# Patient Record
Sex: FEMALE | Race: BLACK OR AFRICAN AMERICAN | NOT HISPANIC OR LATINO | Employment: FULL TIME | URBAN - METROPOLITAN AREA
[De-identification: names, ages, dates, MRNs, and addresses within clinical notes are randomized per-mention and may not be internally consistent; named-entity substitution may affect disease eponyms.]

---

## 2017-06-07 ENCOUNTER — CLINICAL SUPPORT (OUTPATIENT)
Dept: OTHER | Facility: CLINIC | Age: 22
End: 2017-06-07
Payer: COMMERCIAL

## 2017-06-07 VITALS
BODY MASS INDEX: 20.16 KG/M2 | DIASTOLIC BLOOD PRESSURE: 71 MMHG | WEIGHT: 121 LBS | SYSTOLIC BLOOD PRESSURE: 111 MMHG | HEIGHT: 65 IN

## 2017-06-07 DIAGNOSIS — Z00.8 HEALTH EXAMINATION IN POPULATION SURVEYS: Primary | ICD-10-CM

## 2017-06-07 LAB
GLUCOSE SERPL-MCNC: NORMAL MG/DL (ref 60–140)
POC CHOLESTEROL, HDL: 71 MG/DL (ref 40–?)
POC CHOLESTEROL, LDL: 95 MG/DL (ref ?–160)
POC CHOLESTEROL, TOTAL: 177 MG/DL (ref ?–240)
POC GLUCOSE FASTING: 87 MG/DL (ref 60–110)
POC TOTAL CHOLESTEROL / HDL RATIO: 2.4 (ref ?–6)
POC TRIGLYCERIDES: 53 MG/DL (ref ?–160)

## 2017-06-07 PROCEDURE — 99401 PREV MED CNSL INDIV APPRX 15: CPT | Mod: S$GLB,,, | Performed by: INTERNAL MEDICINE

## 2017-06-07 PROCEDURE — 82947 ASSAY GLUCOSE BLOOD QUANT: CPT | Mod: QW,S$GLB,, | Performed by: INTERNAL MEDICINE

## 2017-06-07 PROCEDURE — 80061 LIPID PANEL: CPT | Mod: QW,S$GLB,, | Performed by: INTERNAL MEDICINE

## 2018-06-13 ENCOUNTER — CLINICAL SUPPORT (OUTPATIENT)
Dept: OTHER | Facility: CLINIC | Age: 23
End: 2018-06-13
Payer: COMMERCIAL

## 2018-06-13 VITALS
SYSTOLIC BLOOD PRESSURE: 100 MMHG | WEIGHT: 123.38 LBS | BODY MASS INDEX: 20.55 KG/M2 | DIASTOLIC BLOOD PRESSURE: 64 MMHG | HEIGHT: 65 IN

## 2018-06-13 DIAGNOSIS — Z00.8 HEALTH EXAMINATION IN POPULATION SURVEYS: Primary | ICD-10-CM

## 2018-06-13 LAB
GLUCOSE SERPL-MCNC: NORMAL MG/DL (ref 60–140)
POC CHOLESTEROL, HDL: 62 MG/DL (ref 40–?)
POC CHOLESTEROL, LDL: 125 MG/DL (ref ?–160)
POC CHOLESTEROL, TOTAL: 198 MG/DL (ref ?–240)
POC GLUCOSE FASTING: 78 MG/DL (ref 60–110)
POC TOTAL CHOLESTEROL / HDL RATIO: 3.19 (ref ?–6)
POC TRIGLYCERIDES: 58 MG/DL (ref ?–160)

## 2018-06-13 PROCEDURE — 99401 PREV MED CNSL INDIV APPRX 15: CPT | Mod: S$GLB,,, | Performed by: INTERNAL MEDICINE

## 2018-06-13 PROCEDURE — 80061 LIPID PANEL: CPT | Mod: QW,S$GLB,, | Performed by: INTERNAL MEDICINE

## 2018-06-13 PROCEDURE — 82947 ASSAY GLUCOSE BLOOD QUANT: CPT | Mod: QW,S$GLB,, | Performed by: INTERNAL MEDICINE

## 2021-12-29 ENCOUNTER — TELEPHONE (OUTPATIENT)
Dept: INTERNAL MEDICINE | Facility: CLINIC | Age: 26
End: 2021-12-29

## 2022-10-08 ENCOUNTER — HOSPITAL ENCOUNTER (EMERGENCY)
Facility: HOSPITAL | Age: 27
Discharge: HOME | End: 2022-10-08
Attending: EMERGENCY MEDICINE
Payer: MEDICAID

## 2022-10-08 VITALS
BODY MASS INDEX: 20.71 KG/M2 | HEART RATE: 90 BPM | DIASTOLIC BLOOD PRESSURE: 81 MMHG | WEIGHT: 124.44 LBS | OXYGEN SATURATION: 95 % | SYSTOLIC BLOOD PRESSURE: 120 MMHG | TEMPERATURE: 99 F | RESPIRATION RATE: 20 BRPM

## 2022-10-08 DIAGNOSIS — J45.901 EXACERBATION OF ASTHMA, UNSPECIFIED ASTHMA SEVERITY, UNSPECIFIED WHETHER PERSISTENT: Primary | ICD-10-CM

## 2022-10-08 PROCEDURE — 25000242 PHARM REV CODE 250 ALT 637 W/ HCPCS: Performed by: EMERGENCY MEDICINE

## 2022-10-08 PROCEDURE — 94640 AIRWAY INHALATION TREATMENT: CPT | Mod: XB

## 2022-10-08 PROCEDURE — 99283 EMERGENCY DEPT VISIT LOW MDM: CPT | Mod: 25

## 2022-10-08 RX ORDER — IPRATROPIUM BROMIDE AND ALBUTEROL SULFATE 2.5; .5 MG/3ML; MG/3ML
3 SOLUTION RESPIRATORY (INHALATION)
Status: COMPLETED | OUTPATIENT
Start: 2022-10-08 | End: 2022-10-08

## 2022-10-08 RX ORDER — ALBUTEROL SULFATE 90 UG/1
1-2 AEROSOL, METERED RESPIRATORY (INHALATION) EVERY 6 HOURS PRN
Qty: 18 G | Refills: 0 | Status: SHIPPED | OUTPATIENT
Start: 2022-10-08

## 2022-10-08 RX ADMIN — IPRATROPIUM BROMIDE AND ALBUTEROL SULFATE 3 ML: 2.5; .5 SOLUTION RESPIRATORY (INHALATION) at 03:10

## 2022-10-08 RX ADMIN — IPRATROPIUM BROMIDE AND ALBUTEROL SULFATE 3 ML: 2.5; .5 SOLUTION RESPIRATORY (INHALATION) at 04:10

## 2022-10-08 NOTE — Clinical Note
"Georgina Rodrigueztani Interiano was seen and treated in our emergency department on 10/8/2022.  She may return to work on 10/10/2022.       If you have any questions or concerns, please don't hesitate to call.      Wali Noble RN    "

## 2022-10-08 NOTE — ED PROVIDER NOTES
SCRIBE #1 NOTE: I, Pipe Farah, am scribing for, and in the presence of, Luis Wu MD. I have scribed the entire note.      History      Chief Complaint   Patient presents with    Shortness of Breath     Pt. C/o having a history of asthma, and is having wheezing, and SOB. With a cough. Pt states he home O2 stat was 87-90. Pt also states she does not have a MDI at home         Review of patient's allergies indicates:  No Known Allergies     HPI   HPI    10/8/2022, 3:37 AM   History obtained from the patient      History of Present Illness: Georgina Interiano is a 26 y.o. female patient who presents to the Emergency Department for SOB, onset 3 hours PTA. Pt states that she was dx with asthma following her first pregnancy. Symptoms are constant and moderate in severity. No mitigating or exacerbating factors reported. Associated sxs include cough. Patient denies any fever, chills, n/v/d, CP, weakness, numbness, dizziness, headache, and all other sxs at this time. Pt states that she uses Symbicort at home, as well as her son's nebulizer machine. Pt states that she does not have a nebulizer machine of her own. No further complaints or concerns at this time.     Arrival mode: Personal vehicle    PCP: SUREKHA Davila Jr       Past Medical History:  No past medical history on file.    Past Surgical History:  No past surgical history on file.      Family History:  No family history on file.    Social History:  Social History     Tobacco Use    Smoking status: Never    Smokeless tobacco: Not on file   Substance and Sexual Activity    Alcohol use: No    Drug use: No    Sexual activity: Not on file       ROS   Review of Systems   Constitutional:  Negative for chills and fever.   HENT:  Negative for sore throat.    Respiratory:  Positive for cough and shortness of breath.    Cardiovascular:  Negative for chest pain.   Gastrointestinal:  Negative for diarrhea, nausea and vomiting.   Genitourinary:  Negative for dysuria.    Musculoskeletal:  Negative for back pain.   Skin:  Negative for rash.   Neurological:  Negative for dizziness, weakness, light-headedness, numbness and headaches.   Hematological:  Does not bruise/bleed easily.   All other systems reviewed and are negative.    Physical Exam      Initial Vitals [10/08/22 0237]   BP Pulse Resp Temp SpO2   103/77 97 (!) 22 98.9 °F (37.2 °C) (!) 94 %      MAP       --          Physical Exam  Nursing Notes and Vital Signs Reviewed.  Constitutional: Patient is in no acute distress. Well-developed and well-nourished.  Head: Atraumatic. Normocephalic.  Eyes: PERRL. EOM intact. Conjunctivae are not pale. No scleral icterus.  ENT: Mucous membranes are moist. Oropharynx is clear and symmetric.    Neck: Supple. Full ROM. No lymphadenopathy.  Cardiovascular: Regular rate. Regular rhythm. No murmurs, rubs, or gallops. Distal pulses are 2+ and symmetric.  Pulmonary/Chest: Expiratory wheezing with prolonged expiratory phase.  Abdominal: Soft and non-distended.  There is no tenderness.  No rebound, guarding, or rigidity.   Musculoskeletal: Moves all extremities. No obvious deformities. No edema.  Skin: Warm and dry.  Neurological:  Alert, awake, and appropriate.  Normal speech.  No acute focal neurological deficits are appreciated.  Psychiatric: Normal affect. Good eye contact. Appropriate in content.    ED Course    Procedures  ED Vital Signs:  Vitals:    10/08/22 0237 10/08/22 0255 10/08/22 0333 10/08/22 0356   BP: 103/77 (!) 151/82 130/81    Pulse: 97 93 102 88   Resp: (!) 22 (!) 22 (!) 22 (!) 21   Temp: 98.9 °F (37.2 °C) 98.9 °F (37.2 °C)     TempSrc: Oral      SpO2: (!) 94% (!) 94% (!) 94% 95%   Weight: 56.4 kg (124 lb 7.2 oz)       10/08/22 0417 10/08/22 0456   BP:  120/81   Pulse: 82 90   Resp: 18 20   Temp:  99 °F (37.2 °C)   TempSrc:     SpO2: 96% 95%   Weight:         Abnormal Lab Results:  Labs Reviewed - No data to display     Imaging Results:  Imaging Results    None                  The Emergency Provider reviewed the vital signs and test results, which are outlined above.    ED Discussion     4:54 AM: Reassessed pt at this time.  Pt states her condition has improved at this time. Discussed with pt all pertinent ED information. Discussed pt dx and plan of tx. Gave pt all f/u and return to the ED instructions. All questions and concerns were addressed at this time. Pt expresses understanding of information and instructions, and is comfortable with plan to discharge. Pt is stable for discharge.    I discussed with patient and/or family/caretaker that evaluation in the ED does not suggest any emergent or life threatening medical conditions requiring immediate intervention beyond what was provided in the ED, and I believe patient is safe for discharge.  Regardless, an unremarkable evaluation in the ED does not preclude the development or presence of a serious of life threatening condition. As such, patient was instructed to return immediately for any worsening or change in current symptoms.         ED Medication(s):  Medications   albuterol-ipratropium 2.5 mg-0.5 mg/3 mL nebulizer solution 3 mL (3 mLs Nebulization Given 10/8/22 0417)   albuterol-ipratropium 2.5 mg-0.5 mg/3 mL nebulizer solution 3 mL (3 mLs Nebulization Given 10/8/22 0356)        Follow-up Information       Call  SUREKHA Davila Jr.    Specialty: Family Medicine  Contact information:  [address] [facility] [id number] [phone]               [facility] - Emergency Dept..    Specialty: Emergency Medicine  Why: As needed, If symptoms worsen  Contact information:  [address] [facility] [address] [phone]                         Discharge Medication List as of 10/8/2022  4:55 AM        START taking these medications    Details   albuterol (PROVENTIL/VENTOLIN HFA) 90 mcg/actuation inhaler Inhale 1-2 puffs into the lungs every 6 (six) hours as needed for Wheezing.  Rescue, Starting Sat 10/8/2022, Print               Medical Decision Making              Scribe Attestation:   Scribe #1: I performed the above scribed service and the documentation accurately describes the services I performed. I attest to the accuracy of the note.    Attending:   Physician Attestation Statement for Scribe #1: I, Luis Wu MD, personally performed the services described in this documentation, as scribed by Pipe Farah, in my presence, and it is both accurate and complete.          Clinical Impression       ICD-10-CM ICD-9-CM   1. Exacerbation of asthma, unspecified asthma severity, unspecified whether persistent  J45.901 493.92       Disposition:   Disposition: Discharged  Condition: Stable       Luis Wu MD  10/17/22 0156

## 2024-11-26 ENCOUNTER — HOSPITAL ENCOUNTER (OUTPATIENT)
Facility: HOSPITAL | Age: 29
Discharge: HOME | End: 2024-11-26
Attending: EMERGENCY MEDICINE | Admitting: HOSPITALIST
Payer: COMMERCIAL

## 2024-11-26 VITALS
RESPIRATION RATE: 18 BRPM | HEART RATE: 94 BPM | SYSTOLIC BLOOD PRESSURE: 99 MMHG | DIASTOLIC BLOOD PRESSURE: 54 MMHG | OXYGEN SATURATION: 96 % | TEMPERATURE: 98 F

## 2024-11-26 DIAGNOSIS — J96.01 ACUTE RESPIRATORY FAILURE WITH HYPOXIA: Primary | ICD-10-CM

## 2024-11-26 DIAGNOSIS — J45.901 ASTHMA EXACERBATION: Primary | ICD-10-CM

## 2024-11-26 DIAGNOSIS — J45.901 EXACERBATION OF ASTHMA, UNSPECIFIED ASTHMA SEVERITY, UNSPECIFIED WHETHER PERSISTENT: ICD-10-CM

## 2024-11-26 PROBLEM — D72.829 LEUKOCYTOSIS: Status: ACTIVE | Noted: 2024-11-26

## 2024-11-26 PROBLEM — D72.829 LEUKOCYTOSIS: Status: RESOLVED | Noted: 2024-11-26 | Resolved: 2024-11-26

## 2024-11-26 LAB
ALBUMIN SERPL BCP-MCNC: 3.8 G/DL (ref 3.5–5.2)
ALP SERPL-CCNC: 64 U/L (ref 40–150)
ALT SERPL W/O P-5'-P-CCNC: 12 U/L (ref 10–44)
ANION GAP SERPL CALC-SCNC: 10 MMOL/L (ref 8–16)
ANION GAP SERPL CALC-SCNC: 12 MMOL/L (ref 8–16)
AST SERPL-CCNC: 14 U/L (ref 10–40)
BASOPHILS # BLD AUTO: 0.01 K/UL (ref 0–0.2)
BASOPHILS # BLD AUTO: 0.11 K/UL (ref 0–0.2)
BASOPHILS NFR BLD: 0.1 % (ref 0–1.9)
BASOPHILS NFR BLD: 0.8 % (ref 0–1.9)
BILIRUB SERPL-MCNC: 0.4 MG/DL (ref 0.1–1)
BUN SERPL-MCNC: 13 MG/DL (ref 6–20)
BUN SERPL-MCNC: 9 MG/DL (ref 6–20)
CALCIUM SERPL-MCNC: 9.1 MG/DL (ref 8.7–10.5)
CALCIUM SERPL-MCNC: 9.1 MG/DL (ref 8.7–10.5)
CHLORIDE SERPL-SCNC: 105 MMOL/L (ref 95–110)
CHLORIDE SERPL-SCNC: 108 MMOL/L (ref 95–110)
CO2 SERPL-SCNC: 19 MMOL/L (ref 23–29)
CO2 SERPL-SCNC: 21 MMOL/L (ref 23–29)
CREAT SERPL-MCNC: 0.8 MG/DL (ref 0.5–1.4)
CREAT SERPL-MCNC: 0.9 MG/DL (ref 0.5–1.4)
DIFFERENTIAL METHOD BLD: ABNORMAL
DIFFERENTIAL METHOD BLD: ABNORMAL
EOSINOPHIL # BLD AUTO: 0 K/UL (ref 0–0.5)
EOSINOPHIL # BLD AUTO: 3.5 K/UL (ref 0–0.5)
EOSINOPHIL NFR BLD: 0.1 % (ref 0–8)
EOSINOPHIL NFR BLD: 24.3 % (ref 0–8)
ERYTHROCYTE [DISTWIDTH] IN BLOOD BY AUTOMATED COUNT: 12.7 % (ref 11.5–14.5)
ERYTHROCYTE [DISTWIDTH] IN BLOOD BY AUTOMATED COUNT: 12.9 % (ref 11.5–14.5)
EST. GFR  (NO RACE VARIABLE): >60 ML/MIN/1.73 M^2
EST. GFR  (NO RACE VARIABLE): >60 ML/MIN/1.73 M^2
GLUCOSE SERPL-MCNC: 104 MG/DL (ref 70–110)
GLUCOSE SERPL-MCNC: 211 MG/DL (ref 70–110)
HCT VFR BLD AUTO: 41.4 % (ref 37–48.5)
HCT VFR BLD AUTO: 45.2 % (ref 37–48.5)
HCV AB SERPL QL IA: NEGATIVE
HEP C VIRUS HOLD SPECIMEN: NORMAL
HGB BLD-MCNC: 13.8 G/DL (ref 12–16)
HGB BLD-MCNC: 14.7 G/DL (ref 12–16)
HIV 1+2 AB+HIV1 P24 AG SERPL QL IA: NEGATIVE
IMM GRANULOCYTES # BLD AUTO: 0.04 K/UL (ref 0–0.04)
IMM GRANULOCYTES # BLD AUTO: 0.05 K/UL (ref 0–0.04)
IMM GRANULOCYTES NFR BLD AUTO: 0.3 % (ref 0–0.5)
IMM GRANULOCYTES NFR BLD AUTO: 0.4 % (ref 0–0.5)
INFLUENZA A, MOLECULAR: NEGATIVE
INFLUENZA B, MOLECULAR: NEGATIVE
LYMPHOCYTES # BLD AUTO: 0.4 K/UL (ref 1–4.8)
LYMPHOCYTES # BLD AUTO: 3.6 K/UL (ref 1–4.8)
LYMPHOCYTES NFR BLD: 25.3 % (ref 18–48)
LYMPHOCYTES NFR BLD: 3.1 % (ref 18–48)
MCH RBC QN AUTO: 31.7 PG (ref 27–31)
MCH RBC QN AUTO: 32.5 PG (ref 27–31)
MCHC RBC AUTO-ENTMCNC: 32.5 G/DL (ref 32–36)
MCHC RBC AUTO-ENTMCNC: 33.3 G/DL (ref 32–36)
MCV RBC AUTO: 97 FL (ref 82–98)
MCV RBC AUTO: 98 FL (ref 82–98)
MONOCYTES # BLD AUTO: 0 K/UL (ref 0.3–1)
MONOCYTES # BLD AUTO: 0.9 K/UL (ref 0.3–1)
MONOCYTES NFR BLD: 0.3 % (ref 4–15)
MONOCYTES NFR BLD: 6.4 % (ref 4–15)
NEUTROPHILS # BLD AUTO: 10.9 K/UL (ref 1.8–7.7)
NEUTROPHILS # BLD AUTO: 6.2 K/UL (ref 1.8–7.7)
NEUTROPHILS NFR BLD: 42.9 % (ref 38–73)
NEUTROPHILS NFR BLD: 96 % (ref 38–73)
NRBC BLD-RTO: 0 /100 WBC
NRBC BLD-RTO: 0 /100 WBC
OHS QRS DURATION: 74 MS
OHS QTC CALCULATION: 412 MS
PLATELET # BLD AUTO: 339 K/UL (ref 150–450)
PLATELET # BLD AUTO: 355 K/UL (ref 150–450)
PMV BLD AUTO: 10.3 FL (ref 9.2–12.9)
PMV BLD AUTO: 9.7 FL (ref 9.2–12.9)
POTASSIUM SERPL-SCNC: 3.7 MMOL/L (ref 3.5–5.1)
POTASSIUM SERPL-SCNC: 4.4 MMOL/L (ref 3.5–5.1)
PROT SERPL-MCNC: 7.5 G/DL (ref 6–8.4)
RBC # BLD AUTO: 4.24 M/UL (ref 4–5.4)
RBC # BLD AUTO: 4.64 M/UL (ref 4–5.4)
SARS-COV-2 RDRP RESP QL NAA+PROBE: NEGATIVE
SODIUM SERPL-SCNC: 136 MMOL/L (ref 136–145)
SODIUM SERPL-SCNC: 139 MMOL/L (ref 136–145)
SPECIMEN SOURCE: NORMAL
WBC # BLD AUTO: 11.39 K/UL (ref 3.9–12.7)
WBC # BLD AUTO: 14.4 K/UL (ref 3.9–12.7)

## 2024-11-26 PROCEDURE — 85025 COMPLETE CBC W/AUTO DIFF WBC: CPT | Mod: 91 | Performed by: NURSE PRACTITIONER

## 2024-11-26 PROCEDURE — 87389 HIV-1 AG W/HIV-1&-2 AB AG IA: CPT | Performed by: EMERGENCY MEDICINE

## 2024-11-26 PROCEDURE — 85025 COMPLETE CBC W/AUTO DIFF WBC: CPT

## 2024-11-26 PROCEDURE — 36415 COLL VENOUS BLD VENIPUNCTURE: CPT | Performed by: NURSE PRACTITIONER

## 2024-11-26 PROCEDURE — 25000242 PHARM REV CODE 250 ALT 637 W/ HCPCS: Performed by: EMERGENCY MEDICINE

## 2024-11-26 PROCEDURE — 99285 EMERGENCY DEPT VISIT HI MDM: CPT | Mod: 25

## 2024-11-26 PROCEDURE — G0378 HOSPITAL OBSERVATION PER HR: HCPCS

## 2024-11-26 PROCEDURE — 86803 HEPATITIS C AB TEST: CPT | Performed by: EMERGENCY MEDICINE

## 2024-11-26 PROCEDURE — 96374 THER/PROPH/DIAG INJ IV PUSH: CPT

## 2024-11-26 PROCEDURE — 94640 AIRWAY INHALATION TREATMENT: CPT | Mod: XB

## 2024-11-26 PROCEDURE — 25000242 PHARM REV CODE 250 ALT 637 W/ HCPCS: Performed by: NURSE PRACTITIONER

## 2024-11-26 PROCEDURE — 63600175 PHARM REV CODE 636 W HCPCS

## 2024-11-26 PROCEDURE — 87502 INFLUENZA DNA AMP PROBE: CPT | Performed by: EMERGENCY MEDICINE

## 2024-11-26 PROCEDURE — 25000003 PHARM REV CODE 250: Performed by: NURSE PRACTITIONER

## 2024-11-26 PROCEDURE — 87635 SARS-COV-2 COVID-19 AMP PRB: CPT | Performed by: EMERGENCY MEDICINE

## 2024-11-26 PROCEDURE — 63600175 PHARM REV CODE 636 W HCPCS: Performed by: EMERGENCY MEDICINE

## 2024-11-26 PROCEDURE — 80053 COMPREHEN METABOLIC PANEL: CPT

## 2024-11-26 PROCEDURE — 80048 BASIC METABOLIC PNL TOTAL CA: CPT | Mod: XB | Performed by: NURSE PRACTITIONER

## 2024-11-26 PROCEDURE — 96372 THER/PROPH/DIAG INJ SC/IM: CPT | Performed by: EMERGENCY MEDICINE

## 2024-11-26 PROCEDURE — 27000221 HC OXYGEN, UP TO 24 HOURS

## 2024-11-26 PROCEDURE — 94761 N-INVAS EAR/PLS OXIMETRY MLT: CPT

## 2024-11-26 PROCEDURE — 99900035 HC TECH TIME PER 15 MIN (STAT)

## 2024-11-26 RX ORDER — TERBUTALINE SULFATE 1 MG/ML
0.25 INJECTION SUBCUTANEOUS ONCE
Status: COMPLETED | OUTPATIENT
Start: 2024-11-26 | End: 2024-11-26

## 2024-11-26 RX ORDER — ACETAMINOPHEN 325 MG/1
650 TABLET ORAL EVERY 6 HOURS PRN
Status: DISCONTINUED | OUTPATIENT
Start: 2024-11-26 | End: 2024-11-26 | Stop reason: HOSPADM

## 2024-11-26 RX ORDER — PREDNISONE 20 MG/1
60 TABLET ORAL
Status: COMPLETED | OUTPATIENT
Start: 2024-11-26 | End: 2024-11-26

## 2024-11-26 RX ORDER — ALBUTEROL SULFATE 90 UG/1
1-2 INHALANT RESPIRATORY (INHALATION) EVERY 6 HOURS PRN
Qty: 18 G | Refills: 3 | Status: SHIPPED | OUTPATIENT
Start: 2024-11-26

## 2024-11-26 RX ORDER — IPRATROPIUM BROMIDE AND ALBUTEROL SULFATE 2.5; .5 MG/3ML; MG/3ML
3 SOLUTION RESPIRATORY (INHALATION)
Status: DISCONTINUED | OUTPATIENT
Start: 2024-11-26 | End: 2024-11-26

## 2024-11-26 RX ORDER — ONDANSETRON HYDROCHLORIDE 2 MG/ML
4 INJECTION, SOLUTION INTRAVENOUS EVERY 8 HOURS PRN
Status: DISCONTINUED | OUTPATIENT
Start: 2024-11-26 | End: 2024-11-26 | Stop reason: HOSPADM

## 2024-11-26 RX ORDER — IPRATROPIUM BROMIDE AND ALBUTEROL SULFATE 2.5; .5 MG/3ML; MG/3ML
3 SOLUTION RESPIRATORY (INHALATION) EVERY 4 HOURS
Status: DISCONTINUED | OUTPATIENT
Start: 2024-11-26 | End: 2024-11-26 | Stop reason: HOSPADM

## 2024-11-26 RX ORDER — PREDNISONE 20 MG/1
60 TABLET ORAL DAILY
Status: DISCONTINUED | OUTPATIENT
Start: 2024-11-27 | End: 2024-11-26 | Stop reason: HOSPADM

## 2024-11-26 RX ORDER — GUAIFENESIN 100 MG/5ML
200 SOLUTION ORAL EVERY 4 HOURS PRN
Status: DISCONTINUED | OUTPATIENT
Start: 2024-11-26 | End: 2024-11-26 | Stop reason: HOSPADM

## 2024-11-26 RX ORDER — IPRATROPIUM BROMIDE AND ALBUTEROL SULFATE 2.5; .5 MG/3ML; MG/3ML
3 SOLUTION RESPIRATORY (INHALATION)
Status: COMPLETED | OUTPATIENT
Start: 2024-11-26 | End: 2024-11-26

## 2024-11-26 RX ORDER — PREDNISONE 10 MG/1
20 TABLET ORAL 2 TIMES DAILY
Qty: 20 TABLET | Refills: 0 | Status: SHIPPED | OUTPATIENT
Start: 2024-11-26 | End: 2024-12-01

## 2024-11-26 RX ORDER — METHYLPREDNISOLONE SOD SUCC 125 MG
125 VIAL (EA) INJECTION
Status: COMPLETED | OUTPATIENT
Start: 2024-11-26 | End: 2024-11-26

## 2024-11-26 RX ORDER — SODIUM CHLORIDE 0.9 % (FLUSH) 0.9 %
10 SYRINGE (ML) INJECTION
Status: DISCONTINUED | OUTPATIENT
Start: 2024-11-26 | End: 2024-11-26 | Stop reason: HOSPADM

## 2024-11-26 RX ADMIN — IPRATROPIUM BROMIDE AND ALBUTEROL SULFATE 3 ML: 2.5; .5 SOLUTION RESPIRATORY (INHALATION) at 01:11

## 2024-11-26 RX ADMIN — PREDNISONE 60 MG: 20 TABLET ORAL at 12:11

## 2024-11-26 RX ADMIN — IPRATROPIUM BROMIDE AND ALBUTEROL SULFATE 3 ML: 2.5; .5 SOLUTION RESPIRATORY (INHALATION) at 07:11

## 2024-11-26 RX ADMIN — IPRATROPIUM BROMIDE AND ALBUTEROL SULFATE 3 ML: 2.5; .5 SOLUTION RESPIRATORY (INHALATION) at 03:11

## 2024-11-26 RX ADMIN — METHYLPREDNISOLONE SODIUM SUCCINATE 125 MG: 125 INJECTION, POWDER, FOR SOLUTION INTRAMUSCULAR; INTRAVENOUS at 12:11

## 2024-11-26 RX ADMIN — GUAIFENESIN 200 MG: 200 SOLUTION ORAL at 04:11

## 2024-11-26 RX ADMIN — TERBUTALINE SULFATE 0.25 MG: 1 INJECTION SUBCUTANEOUS at 12:11

## 2024-11-26 NOTE — HOSPITAL COURSE
11/26/24  MARITZA STRONG, wheezing resolved  Patient reports she was recently hospitalized at [facility]  History of asthma, follows with Pulmonary  Also works at a [employer], likely triggering exacerbations  Stable for discharge home today  Prescribed 5 day steroid course  Ambulatory referral to Allergy/Immunology  F/U with PCP as needed

## 2024-11-26 NOTE — HPI
Georgina Interiano is a 28 y.o. female with a PMH  has a past medical history of Asthma.presented to the Emergency Department for evaluation of asthma attack which onset today. Pt reports that her asthma attack worsened throughout the day without any relief from duo nebulizer treatments and emergency inhaler at home. Recently discharged from [facility] a few days ago after being admitted for observation for asthma exacerbation. Patient was discharged with oral steroids and reports she has a few days left of treatment. Symptoms are constant and moderate in severity. No mitigating or exacerbating factors reported. Associated sxs include wheezing, labored breathing, and SOB. Patient denies any fever, nausea, chills, and all other sxs at this time. Prior Tx includes duo nebulizer treatments and emergency inhaler. No further complaints or concerns at this time.     ER workup revealed leukocytosis of 14.40 with remaining CBC and CMP unremarkable.  Flu/COVID negative.  Chest x-ray negative for acute cardiopulmonary findings.  Patient was initially satting 89% on room air with a respiratory rate of 20 6 beats per minute and heart rate of 115.  Symptoms improved after being placed on supplemental oxygen, receiving 3 DuoNeb treatments, 125 mg Solu-Medrol IV, 60 mg prednisone p.o., and 0.25 mg terbutaline subQ.  Patient currently weaned off oxygen satting above 95% on room air.  All remaining vital signs stable.  Patient in no acute respiratory distress.  Wheezing improved, but still present.  Hospital Medicine consulted to admit patient for asthma exacerbation.  Patient in agreement with treatment plan.  Patient admitted under observation status.    PCP: SALAZAR Ferrell Jr.

## 2024-11-26 NOTE — PLAN OF CARE
[facility] - Med Surg 3  Discharge Final Note    Primary Care Provider: SALAZAR Ferrell Jr., FNP    Expected Discharge Date: 11/26/2024    Final Discharge Note (most recent)       Final Note - 11/26/24 1136          Final Note    Assessment Type Final Discharge Note (P)      Anticipated Discharge Disposition Home or Self Care (P)         Post-Acute Status    Discharge Delays None known at this time (P)                      Important Message from Medicare             Contact Info       SALAZAR Ferrell Jr., FNP   Specialty: Family Medicine   Relationship: PCP - General    [address] [facility] [address]   Phone: [phone]       Next Steps: Schedule an appointment as soon as possible for a visit    Instructions: As needed

## 2024-11-26 NOTE — H&P
[facility] - Med Surg [address] [place] [address] Medicine  History & Physical    Patient Name: Georgina Interiano  MRN: [id number]  Patient Class: OP- Observation  Admission Date: 11/26/2024  Attending Physician: Judd Maurice MD   Primary Care Provider: SALAZAR Ferrell Jr., FNP         Patient information was obtained from patient, past medical records, and ER records.     Subjective:     Principal Problem:Asthma exacerbation    Chief Complaint:   Chief Complaint   Patient presents with    Asthma     Asthma attack without relief of duo neb treatments and emergency inhaler at home. Wheezing loudly in triage. Labored breathing. Recently dc from inpatient for asthma         HPI: Georgina Interiano is a 28 y.o. female with a PMH  has a past medical history of Asthma.presented to the Emergency Department for evaluation of asthma attack which onset today. Pt reports that her asthma attack worsened throughout the day without any relief from duo nebulizer treatments and emergency inhaler at home. Recently discharged from [facility] a few days ago after being admitted for observation for asthma exacerbation. Patient was discharged with oral steroids and reports she has a few days left of treatment. Symptoms are constant and moderate in severity. No mitigating or exacerbating factors reported. Associated sxs include wheezing, labored breathing, and SOB. Patient denies any fever, nausea, chills, and all other sxs at this time. Prior Tx includes duo nebulizer treatments and emergency inhaler. No further complaints or concerns at this time.     ER workup revealed leukocytosis of 14.40 with remaining CBC and CMP unremarkable.  Flu/COVID negative.  Chest x-ray negative for acute cardiopulmonary findings.  Patient was initially satting 89% on room air with a respiratory rate of 20 6 beats per minute and heart rate of 115.  Symptoms improved after being placed on supplemental oxygen, receiving 3 DuoNeb treatments, 125 mg Solu-Medrol  IV, 60 mg prednisone p.o., and 0.25 mg terbutaline subQ.  Patient currently weaned off oxygen satting above 95% on room air.  All remaining vital signs stable.  Patient in no acute respiratory distress.  Wheezing improved, but still present.  Hospital Medicine consulted to admit patient for asthma exacerbation.  Patient in agreement with treatment plan.  Patient admitted under observation status.    PCP: SALAZAR Ferrell Jr.      Past Medical History:   Diagnosis Date    Asthma        History reviewed. No pertinent surgical history.    Review of patient's allergies indicates:  No Known Allergies    No current facility-administered medications on file prior to encounter.     Current Outpatient Medications on File Prior to Encounter   Medication Sig    albuterol (PROVENTIL/VENTOLIN HFA) 90 mcg/actuation inhaler Inhale 1-2 puffs into the lungs every 6 (six) hours as needed for Wheezing. Rescue     Family History    None       Tobacco Use    Smoking status: Never    Smokeless tobacco: Not on file   Substance and Sexual Activity    Alcohol use: No    Drug use: No    Sexual activity: Not on file     Review of Systems   Constitutional:  Negative for chills, diaphoresis, fatigue and fever.   HENT:  Negative for congestion and rhinorrhea.    Respiratory:  Positive for chest tightness, shortness of breath and wheezing.    Cardiovascular:  Negative for chest pain, palpitations and leg swelling.   Gastrointestinal:  Negative for abdominal pain, diarrhea, nausea and vomiting.   All other systems reviewed and are negative.    Objective:     Vital Signs (Most Recent):  Temp: 97.4 °F (36.3 °C) (11/26/24 0439)  Pulse: 90 (11/26/24 0508)  Resp: 17 (11/26/24 0439)  BP: (!) 98/52 (11/26/24 0439)  SpO2: 95 % (11/26/24 0439) Vital Signs (24h Range):  Temp:  [97.4 °F (36.3 °C)-97.7 °F (36.5 °C)] 97.4 °F (36.3 °C)  Pulse:  [] 90  Resp:  [17-26] 17  SpO2:  [89 %-100 %] 95 %  BP: ()/(52-87) 98/52        There is no height or weight  on file to calculate BMI.     Physical Exam  Vitals and nursing note reviewed.   Constitutional:       General: She is awake. She is not in acute distress.     Appearance: Normal appearance. She is well-developed and well-groomed. She is not ill-appearing, toxic-appearing or diaphoretic.   HENT:      Head: Normocephalic and atraumatic.   Eyes:      Extraocular Movements: Extraocular movements intact.      Conjunctiva/sclera: Conjunctivae normal.      Pupils: Pupils are equal, round, and reactive to light.   Cardiovascular:      Rate and Rhythm: Normal rate and regular rhythm.      Pulses:           Radial pulses are 2+ on the right side and 2+ on the left side.      Heart sounds: Normal heart sounds. No murmur heard.  Pulmonary:      Effort: Pulmonary effort is normal. No tachypnea or respiratory distress.      Breath sounds: Wheezing present. No rhonchi or rales.   Abdominal:      General: Bowel sounds are normal.      Palpations: Abdomen is soft.      Tenderness: There is no abdominal tenderness.   Musculoskeletal:      Cervical back: Normal range of motion and neck supple.      Right lower leg: No edema.      Left lower leg: No edema.      Comments: 5/5 strength throughout   Skin:     General: Skin is warm and dry.      Capillary Refill: Capillary refill takes less than 2 seconds.   Neurological:      Mental Status: She is alert and oriented to person, place, and time. Mental status is at baseline.      GCS: GCS eye subscore is 4. GCS verbal subscore is 5. GCS motor subscore is 6.      Cranial Nerves: Cranial nerves 2-12 are intact.      Sensory: Sensation is intact.      Motor: Motor function is intact.   Psychiatric:         Mood and Affect: Mood normal.         Speech: Speech normal.         Behavior: Behavior normal. Behavior is cooperative.              CRANIAL NERVES     CN III, IV, VI   Pupils are equal, round, and reactive to light.     LABS:  Recent Results (from the past 24 hours)   CBC auto  differential    Collection Time: 11/26/24 12:47 AM   Result Value Ref Range    WBC 14.40 (H) 3.90 - 12.70 K/uL    RBC 4.64 4.00 - 5.40 M/uL    Hemoglobin 14.7 12.0 - 16.0 g/dL    Hematocrit 45.2 37.0 - 48.5 %    MCV 97 82 - 98 fL    MCH 31.7 (H) 27.0 - 31.0 pg    MCHC 32.5 32.0 - 36.0 g/dL    RDW 12.7 11.5 - 14.5 %    Platelets 355 150 - 450 K/uL    MPV 9.7 9.2 - 12.9 fL    Immature Granulocytes 0.3 0.0 - 0.5 %    Gran # (ANC) 6.2 1.8 - 7.7 K/uL    Immature Grans (Abs) 0.04 0.00 - 0.04 K/uL    Lymph # 3.6 1.0 - 4.8 K/uL    Mono # 0.9 0.3 - 1.0 K/uL    Eos # 3.5 (H) 0.0 - 0.5 K/uL    Baso # 0.11 0.00 - 0.20 K/uL    nRBC 0 0 /100 WBC    Gran % 42.9 38.0 - 73.0 %    Lymph % 25.3 18.0 - 48.0 %    Mono % 6.4 4.0 - 15.0 %    Eosinophil % 24.3 (H) 0.0 - 8.0 %    Basophil % 0.8 0.0 - 1.9 %    Differential Method Automated    Comprehensive metabolic panel    Collection Time: 11/26/24 12:47 AM   Result Value Ref Range    Sodium 139 136 - 145 mmol/L    Potassium 3.7 3.5 - 5.1 mmol/L    Chloride 108 95 - 110 mmol/L    CO2 21 (L) 23 - 29 mmol/L    Glucose 104 70 - 110 mg/dL    BUN 13 6 - 20 mg/dL    Creatinine 0.8 0.5 - 1.4 mg/dL    Calcium 9.1 8.7 - 10.5 mg/dL    Total Protein 7.5 6.0 - 8.4 g/dL    Albumin 3.8 3.5 - 5.2 g/dL    Total Bilirubin 0.4 0.1 - 1.0 mg/dL    Alkaline Phosphatase 64 40 - 150 U/L    AST 14 10 - 40 U/L    ALT 12 10 - 44 U/L    eGFR >60 >60 mL/min/1.73 m^2    Anion Gap 10 8 - 16 mmol/L   Hepatitis C Antibody    Collection Time: 11/26/24 12:47 AM   Result Value Ref Range    Hepatitis C Ab Negative Negative   HCV Virus Hold Specimen    Collection Time: 11/26/24 12:47 AM   Result Value Ref Range    HEP C Virus Hold Specimen Hold for HCV sendout    HIV 1/2 Ag/Ab (4th Gen)    Collection Time: 11/26/24 12:47 AM   Result Value Ref Range    HIV 1/2 Ag/Ab Negative Negative   COVID-19 Rapid Screening    Collection Time: 11/26/24 12:53 AM   Result Value Ref Range    SARS-CoV-2 RNA, Amplification, Qual Negative  Negative   Influenza A & B by Molecular    Collection Time: 11/26/24 12:53 AM    Specimen: Nasopharyngeal Swab   Result Value Ref Range    Influenza A, Molecular Negative Negative    Influenza B, Molecular Negative Negative    Flu A & B Source Nasal swab        RADIOLOGY  X-Ray Chest AP Portable    Result Date: 11/26/2024  EXAMINATION: XR CHEST AP PORTABLE CLINICAL HISTORY: Asthma; TECHNIQUE: Single frontal view of the chest was performed. COMPARISON: None FINDINGS: Lungs are clear. No focal consolidation. No pleural effusion. No pneumothorax. Normal heart size.     No acute findings. Electronically signed by: Willard Tiwari Date:    11/26/2024 Time:    01:56      EKG    MICROBIOLOGY    MDM     Amount and/or Complexity of Data Reviewed  Clinical lab tests: reviewed  Tests in the radiology section of CPT®: reviewed  Tests in the medicine section of CPT®: reviewed  Discussion of test results with the performing providers: yes  Decide to obtain previous medical records or to obtain history from someone other than the patient: yes  Obtain history from someone other than the patient: yes  Review and summarize past medical records: yes  Discuss the patient with other providers: yes  Independent visualization of images, tracings, or specimens: yes        Assessment/Plan:     * Asthma exacerbation  Patient's COPD is with exacerbation noted by continued dyspnea and worsening of baseline hypoxia currently.  Patient is currently on Asthma Pathway. Continue scheduled inhalers  duonebs, Steroids, and Supplemental oxygen and monitor respiratory status closely.         Leukocytosis  Likely secondary from administration of steroids.  Afebrile.  Flu/COVID negative.  Plan:   -repeat labs in a.m.   -symptomatic treatment  -additional labs/imaging if labs worsening or develops fever        VTE Risk Mitigation (From admission, onward)           Ordered     Reason for No Pharmacological VTE Prophylaxis  Once        Question:   Reasons:  Answer:  Patient is Ambulatory    11/26/24 0150     IP VTE HIGH RISK PATIENT  Once         11/26/24 0150     Place sequential compression device  Until discontinued         11/26/24 0150                     //Core Measures   -DVT proph: SCDs,    -Code status Full    -Surrogate:none present       Components of this note were documented using a voice recognition system and are subject to errors not corrected at the time the document was proof read. Please contact the author for any clarifications.        Michael Maurice NP  Department of Hospital Medicine  [facility] - Med Surg [facility]

## 2024-11-26 NOTE — ASSESSMENT & PLAN NOTE
Likely secondary from administration of steroids.  Afebrile.  Flu/COVID negative.  Plan:   -repeat labs in a.m.   -symptomatic treatment  -additional labs/imaging if labs worsening or develops fever

## 2024-11-26 NOTE — DISCHARGE SUMMARY
[facility]  [facility] Medicine  Discharge Summary      Patient Name: Georgina Interiano  MRN: [id number]  THEA: [id number]  Patient Class: OP- Observation  Admission Date: 11/26/2024  Hospital Length of Stay: 0 days  Discharge Date and Time: No discharge date for patient encounter.  Attending Physician: Oliver Rodriguez MD   Discharging Provider: Oliver Rodriguez MD  Primary Care Provider: SALAZAR Ferrell Jr. REFUGIO    Primary Care Team: Networked reference to record PCT     HPI:   Georgina Interiano is a 28 y.o. female with a PMH  has a past medical history of Asthma.presented to the Emergency Department for evaluation of asthma attack which onset today. Pt reports that her asthma attack worsened throughout the day without any relief from duo nebulizer treatments and emergency inhaler at home. Recently discharged from [facility] a few days ago after being admitted for observation for asthma exacerbation. Patient was discharged with oral steroids and reports she has a few days left of treatment. Symptoms are constant and moderate in severity. No mitigating or exacerbating factors reported. Associated sxs include wheezing, labored breathing, and SOB. Patient denies any fever, nausea, chills, and all other sxs at this time. Prior Tx includes duo nebulizer treatments and emergency inhaler. No further complaints or concerns at this time.     ER workup revealed leukocytosis of 14.40 with remaining CBC and CMP unremarkable.  Flu/COVID negative.  Chest x-ray negative for acute cardiopulmonary findings.  Patient was initially satting 89% on room air with a respiratory rate of 20 6 beats per minute and heart rate of 115.  Symptoms improved after being placed on supplemental oxygen, receiving 3 DuoNeb treatments, 125 mg Solu-Medrol IV, 60 mg prednisone p.o., and 0.25 mg terbutaline subQ.  Patient currently weaned off oxygen satting above 95% on room air.  All remaining vital signs stable.  Patient in no acute  respiratory distress.  Wheezing improved, but still present.  Hospital Medicine consulted to admit patient for asthma exacerbation.  Patient in agreement with treatment plan.  Patient admitted under observation status.    PCP: SALAZAR Ferrell Jr.      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     Consults:     No new Assessment & Plan notes have been filed under this hospital service since the last note was generated.  Service: Hospital Medicine    Final Active Diagnoses:      Problems Resolved During this Admission:    Diagnosis Date Noted Date Resolved POA    PRINCIPAL PROBLEM:  Asthma exacerbation [J45.901] 11/26/2024 11/26/2024 Yes    Leukocytosis [D72.829] 11/26/2024 11/26/2024 Yes       Discharged Condition: stable    Disposition: Home or Self Care    Follow Up:   Follow-up Information       SALAZAR Ferrell Jr., FNP. Schedule an appointment as soon as possible for a visit.    Specialty: Family Medicine  Why: As needed  Contact information:  [address] [facility] [place] [address] [phone]                           Patient Instructions:      Ambulatory referral/consult to Pediatric Allergy and Immunology   Standing Status: Future   Referral Priority: Urgent Referral Type: Consultation   Referral Reason: Specialty Services Required   Requested Specialty: Pediatric Allergy    Number of Visits Requested: 1       Significant Diagnostic Studies: Labs: All labs within the past 24 hours have been reviewed    Pending Diagnostic Studies:       Procedure Component Value Units Date/Time    Basic metabolic panel [[id number]] Collected: 11/26/24 0902    Order Status: Sent Lab Status: In process Updated: 11/26/24 0920    Specimen: Blood     CBC auto differential [[id number]] Collected: 11/26/24 0902    Order Status: Sent Lab Status: No result     Specimen: Blood            Medications:  Reconciled Home Medications:      Medication List        START taking these medications      predniSONE 10 MG tablet  Commonly known as: DELTASONE  Take 2 tablets (20 mg total) by mouth 2 (two) times daily. for 5 days            CONTINUE taking these medications      albuterol 90 mcg/actuation inhaler  Commonly known as: PROVENTIL/VENTOLIN HFA  Inhale 1-2 puffs into the lungs every 6 (six) hours as needed for Wheezing. Rescue              Indwelling Lines/Drains at time of discharge:   Lines/Drains/Airways       None                   Time spent on the discharge of patient: 40 minutes         Oliver Rodriguez MD  Department of Hospital Medicine  [facility]   no

## 2024-11-26 NOTE — SUBJECTIVE & OBJECTIVE
Past Medical History:   Diagnosis Date    Asthma        History reviewed. No pertinent surgical history.    Review of patient's allergies indicates:  No Known Allergies    No current facility-administered medications on file prior to encounter.     Current Outpatient Medications on File Prior to Encounter   Medication Sig    albuterol (PROVENTIL/VENTOLIN HFA) 90 mcg/actuation inhaler Inhale 1-2 puffs into the lungs every 6 (six) hours as needed for Wheezing. Rescue     Family History    None       Tobacco Use    Smoking status: Never    Smokeless tobacco: Not on file   Substance and Sexual Activity    Alcohol use: No    Drug use: No    Sexual activity: Not on file     Review of Systems   Constitutional:  Negative for chills, diaphoresis, fatigue and fever.   HENT:  Negative for congestion and rhinorrhea.    Respiratory:  Positive for chest tightness, shortness of breath and wheezing.    Cardiovascular:  Negative for chest pain, palpitations and leg swelling.   Gastrointestinal:  Negative for abdominal pain, diarrhea, nausea and vomiting.   All other systems reviewed and are negative.    Objective:     Vital Signs (Most Recent):  Temp: 97.4 °F (36.3 °C) (11/26/24 0439)  Pulse: 90 (11/26/24 0508)  Resp: 17 (11/26/24 0439)  BP: (!) 98/52 (11/26/24 0439)  SpO2: 95 % (11/26/24 0439) Vital Signs (24h Range):  Temp:  [97.4 °F (36.3 °C)-97.7 °F (36.5 °C)] 97.4 °F (36.3 °C)  Pulse:  [] 90  Resp:  [17-26] 17  SpO2:  [89 %-100 %] 95 %  BP: ()/(52-87) 98/52        There is no height or weight on file to calculate BMI.     Physical Exam  Vitals and nursing note reviewed.   Constitutional:       General: She is awake. She is not in acute distress.     Appearance: Normal appearance. She is well-developed and well-groomed. She is not ill-appearing, toxic-appearing or diaphoretic.   HENT:      Head: Normocephalic and atraumatic.   Eyes:      Extraocular Movements: Extraocular movements intact.      Conjunctiva/sclera:  Conjunctivae normal.      Pupils: Pupils are equal, round, and reactive to light.   Cardiovascular:      Rate and Rhythm: Normal rate and regular rhythm.      Pulses:           Radial pulses are 2+ on the right side and 2+ on the left side.      Heart sounds: Normal heart sounds. No murmur heard.  Pulmonary:      Effort: Pulmonary effort is normal. No tachypnea or respiratory distress.      Breath sounds: Wheezing present. No rhonchi or rales.   Abdominal:      General: Bowel sounds are normal.      Palpations: Abdomen is soft.      Tenderness: There is no abdominal tenderness.   Musculoskeletal:      Cervical back: Normal range of motion and neck supple.      Right lower leg: No edema.      Left lower leg: No edema.      Comments: 5/5 strength throughout   Skin:     General: Skin is warm and dry.      Capillary Refill: Capillary refill takes less than 2 seconds.   Neurological:      Mental Status: She is alert and oriented to person, place, and time. Mental status is at baseline.      GCS: GCS eye subscore is 4. GCS verbal subscore is 5. GCS motor subscore is 6.      Cranial Nerves: Cranial nerves 2-12 are intact.      Sensory: Sensation is intact.      Motor: Motor function is intact.   Psychiatric:         Mood and Affect: Mood normal.         Speech: Speech normal.         Behavior: Behavior normal. Behavior is cooperative.              CRANIAL NERVES     CN III, IV, VI   Pupils are equal, round, and reactive to light.     LABS:  Recent Results (from the past 24 hours)   CBC auto differential    Collection Time: 11/26/24 12:47 AM   Result Value Ref Range    WBC 14.40 (H) 3.90 - 12.70 K/uL    RBC 4.64 4.00 - 5.40 M/uL    Hemoglobin 14.7 12.0 - 16.0 g/dL    Hematocrit 45.2 37.0 - 48.5 %    MCV 97 82 - 98 fL    MCH 31.7 (H) 27.0 - 31.0 pg    MCHC 32.5 32.0 - 36.0 g/dL    RDW 12.7 11.5 - 14.5 %    Platelets 355 150 - 450 K/uL    MPV 9.7 9.2 - 12.9 fL    Immature Granulocytes 0.3 0.0 - 0.5 %    Gran # (ANC) 6.2 1.8 -  7.7 K/uL    Immature Grans (Abs) 0.04 0.00 - 0.04 K/uL    Lymph # 3.6 1.0 - 4.8 K/uL    Mono # 0.9 0.3 - 1.0 K/uL    Eos # 3.5 (H) 0.0 - 0.5 K/uL    Baso # 0.11 0.00 - 0.20 K/uL    nRBC 0 0 /100 WBC    Gran % 42.9 38.0 - 73.0 %    Lymph % 25.3 18.0 - 48.0 %    Mono % 6.4 4.0 - 15.0 %    Eosinophil % 24.3 (H) 0.0 - 8.0 %    Basophil % 0.8 0.0 - 1.9 %    Differential Method Automated    Comprehensive metabolic panel    Collection Time: 11/26/24 12:47 AM   Result Value Ref Range    Sodium 139 136 - 145 mmol/L    Potassium 3.7 3.5 - 5.1 mmol/L    Chloride 108 95 - 110 mmol/L    CO2 21 (L) 23 - 29 mmol/L    Glucose 104 70 - 110 mg/dL    BUN 13 6 - 20 mg/dL    Creatinine 0.8 0.5 - 1.4 mg/dL    Calcium 9.1 8.7 - 10.5 mg/dL    Total Protein 7.5 6.0 - 8.4 g/dL    Albumin 3.8 3.5 - 5.2 g/dL    Total Bilirubin 0.4 0.1 - 1.0 mg/dL    Alkaline Phosphatase 64 40 - 150 U/L    AST 14 10 - 40 U/L    ALT 12 10 - 44 U/L    eGFR >60 >60 mL/min/1.73 m^2    Anion Gap 10 8 - 16 mmol/L   Hepatitis C Antibody    Collection Time: 11/26/24 12:47 AM   Result Value Ref Range    Hepatitis C Ab Negative Negative   HCV Virus Hold Specimen    Collection Time: 11/26/24 12:47 AM   Result Value Ref Range    HEP C Virus Hold Specimen Hold for HCV sendout    HIV 1/2 Ag/Ab (4th Gen)    Collection Time: 11/26/24 12:47 AM   Result Value Ref Range    HIV 1/2 Ag/Ab Negative Negative   COVID-19 Rapid Screening    Collection Time: 11/26/24 12:53 AM   Result Value Ref Range    SARS-CoV-2 RNA, Amplification, Qual Negative Negative   Influenza A & B by Molecular    Collection Time: 11/26/24 12:53 AM    Specimen: Nasopharyngeal Swab   Result Value Ref Range    Influenza A, Molecular Negative Negative    Influenza B, Molecular Negative Negative    Flu A & B Source Nasal swab        RADIOLOGY  X-Ray Chest AP Portable    Result Date: 11/26/2024  EXAMINATION: XR CHEST AP PORTABLE CLINICAL HISTORY: Asthma; TECHNIQUE: Single frontal view of the chest was performed.  COMPARISON: None FINDINGS: Lungs are clear. No focal consolidation. No pleural effusion. No pneumothorax. Normal heart size.     No acute findings. Electronically signed by: Willard Tiwari Date:    11/26/2024 Time:    01:56      EKG    MICROBIOLOGY    MDM     Amount and/or Complexity of Data Reviewed  Clinical lab tests: reviewed  Tests in the radiology section of CPT®: reviewed  Tests in the medicine section of CPT®: reviewed  Discussion of test results with the performing providers: yes  Decide to obtain previous medical records or to obtain history from someone other than the patient: yes  Obtain history from someone other than the patient: yes  Review and summarize past medical records: yes  Discuss the patient with other providers: yes  Independent visualization of images, tracings, or specimens: yes

## 2024-11-26 NOTE — PLAN OF CARE
[facility]  Discharge Assessment    Primary Care Provider: SALAZAR Ferrell Jr., SUREKHA     Discharge Assessment (most recent)       BRIEF DISCHARGE ASSESSMENT - 11/26/24 1138          Discharge Planning    Assessment Type Discharge Planning Brief Assessment (P)      Resource/Environmental Concerns none (P)      Support Systems Parent (P)      Equipment Currently Used at Home none (P)      Current Living Arrangements home (P)      Patient/Family Anticipates Transition to home (P)      Patient/Family Anticipated Services at Transition none (P)      DME Needed Upon Discharge  none (P)      Discharge Plan A Home (P)

## 2024-11-26 NOTE — ED PROVIDER NOTES
SCRIBE #1 NOTE: I, Shabbir Salamanca, am scribing for, and in the presence of, Nba Scherer Jr., MD. I have scribed the entire note.       History     Chief Complaint   Patient presents with    Asthma     Asthma attack without relief of duo neb treatments and emergency inhaler at home. Wheezing loudly in triage. Labored breathing. Recently dc from inpatient for asthma      Review of patient's allergies indicates:  No Known Allergies      History of Present Illness     HPI    11/26/2024, 12:50 AM  History obtained from the patient      History of Present Illness: Georgina Interiano is a 28 y.o. female patient with no PMHx who presents to the Emergency Department for evaluation of asthma attack which onset today. Pt had an asthma attack without relief from duo nebulizer treatments and emergency inhaler at home. Recently discharged from inpatient for asthma. Symptoms are constant and moderate in severity. No mitigating or exacerbating factors reported. Associated sxs include wheezing, labored breathing, and SOB. Patient denies any fever, nausea, chills, and all other sxs at this time. Prior Tx includes duo nebulizer treatments and emergency inhaler. No further complaints or concerns at this time.       Arrival mode: Personal vehicle    PCP: SALAZAR Ferrell Jr., FNP        Past Medical History:  No past medical history on file.    Past Surgical History:  No past surgical history on file.      Family History:  No family history on file.    Social History:  Social History     Tobacco Use    Smoking status: Never    Smokeless tobacco: Not on file   Substance and Sexual Activity    Alcohol use: No    Drug use: No    Sexual activity: Not on file        Review of Systems     Review of Systems   Constitutional:  Negative for chills and fever.   HENT:  Negative for sore throat.    Respiratory:  Positive for shortness of breath, wheezing and stridor.    Cardiovascular:  Negative for chest pain.   Gastrointestinal:  Negative for  nausea.   Genitourinary:  Negative for dysuria.   Musculoskeletal:  Negative for back pain.   Skin:  Negative for rash.   Neurological:  Negative for weakness.   Hematological:  Does not bruise/bleed easily.   All other systems reviewed and are negative.       Physical Exam     Initial Vitals [11/26/24 0032]   BP Pulse Resp Temp SpO2   136/87 (!) 115 (!) 26 97.7 °F (36.5 °C) (!) 89 %      MAP       --          Physical Exam  Nursing Notes and Vital Signs Reviewed.  Constitutional: Patient is in acute distress. Well-developed and well-nourished.  Head: Atraumatic. Normocephalic.  Eyes:  EOM intact.  No scleral icterus.  ENT: Mucous membranes are moist.  Nares clear   Neck:  Full ROM. No JVD.  Cardiovascular: Regular rate. Regular rhythm No murmurs, rubs, or gallops. Distal pulses are 2+ and symmetric  Pulmonary/Chest: Respiratory distress.  Patient was tachypneic and diminished in all lung fields faint end expiratory wheezing Abdominal: Soft and non-distended.  There is no tenderness.  No rebound, guarding, or rigidity. Good bowel sounds.  Genitourinary: No CVA tenderness.  No suprapubic tenderness  Musculoskeletal: Moves all extremities. No obvious deformities.  5 x 5 strength in all extremities   Skin: Warm and dry.  Neurological:  Alert, awake, and appropriate.  Normal speech.  No acute focal neurological deficits are appreciated.  Two through 12 intact bilaterally.  Psychiatric: Normal affect. Good eye contact. Appropriate in content.      ED Course   Critical Care    Date/Time: 11/26/2024 12:51 AM    Performed by: Nba Scherer Jr., MD  Authorized by: Nba Scherer Jr., MD  Direct patient critical care time: 10 minutes  Additional history critical care time: 5 minutes  Ordering / reviewing critical care time: 5 minutes  Documentation critical care time: 5 minutes  Consulting other physicians critical care time: 5 minutes  Consult with family critical care time: 5 minutes  Total critical care time  (exclusive of procedural time) : 35 minutes  Critical care time was exclusive of separately billable procedures and treating other patients and teaching time.  Critical care was necessary to treat or prevent imminent or life-threatening deterioration of the following conditions: respiratory failure.  Critical care was time spent personally by me on the following activities: blood draw for specimens, development of treatment plan with patient or surrogate, discussions with consultants, interpretation of cardiac output measurements, review of old charts, re-evaluation of patient's condition, pulse oximetry, ordering and review of radiographic studies, ordering and review of laboratory studies, ordering and performing treatments and interventions, obtaining history from patient or surrogate, examination of patient and evaluation of patient's response to treatment.        ED Vital Signs:  Vitals:    11/26/24 0032 11/26/24 0034 11/26/24 0042 11/26/24 0100   BP: 136/87  127/81 125/75   Pulse: (!) 115  106 88   Resp: (!) 26   17   Temp: 97.7 °F (36.5 °C)      TempSrc: Oral      SpO2: (!) 89% (!) 93% 98% 100%    11/26/24 0115 11/26/24 0117 11/26/24 0120 11/26/24 0132   BP:   (!) 124/59 (!) 122/57   Pulse: 106 106 83 92   Resp: 18 (!) 22 18 19   Temp:       TempSrc:       SpO2: 98% 98% 100% 97%       Abnormal Lab Results:  Labs Reviewed   CBC W/ AUTO DIFFERENTIAL - Abnormal       Result Value    WBC 14.40 (*)     RBC 4.64      Hemoglobin 14.7      Hematocrit 45.2      MCV 97      MCH 31.7 (*)     MCHC 32.5      RDW 12.7      Platelets 355      MPV 9.7      Immature Granulocytes 0.3      Gran # (ANC) 6.2      Immature Grans (Abs) 0.04      Lymph # 3.6      Mono # 0.9      Eos # 3.5 (*)     Baso # 0.11      nRBC 0      Gran % 42.9      Lymph % 25.3      Mono % 6.4      Eosinophil % 24.3 (*)     Basophil % 0.8      Differential Method Automated     COMPREHENSIVE METABOLIC PANEL - Abnormal    Sodium 139      Potassium 3.7       Chloride 108      CO2 21 (*)     Glucose 104      BUN 13      Creatinine 0.8      Calcium 9.1      Total Protein 7.5      Albumin 3.8      Total Bilirubin 0.4      Alkaline Phosphatase 64      AST 14      ALT 12      eGFR >60      Anion Gap 10     INFLUENZA A & B BY MOLECULAR    Influenza A, Molecular Negative      Influenza B, Molecular Negative      Flu A & B Source Nasal swab     HEPATITIS C ANTIBODY    Hepatitis C Ab Negative      Narrative:     Release to patient->Immediate   HEP C VIRUS HOLD SPECIMEN    HEP C Virus Hold Specimen Hold for HCV sendout      Narrative:     Release to patient->Immediate   HIV 1 / 2 ANTIBODY    HIV 1/2 Ag/Ab Negative      Narrative:     Release to patient->Immediate   SARS-COV-2 RNA AMPLIFICATION, QUAL    SARS-CoV-2 RNA, Amplification, Qual Negative          All Lab Results:  Results for orders placed or performed during the hospital encounter of 11/26/24   CBC auto differential    Collection Time: 11/26/24 12:47 AM   Result Value Ref Range    WBC 14.40 (H) 3.90 - 12.70 K/uL    RBC 4.64 4.00 - 5.40 M/uL    Hemoglobin 14.7 12.0 - 16.0 g/dL    Hematocrit 45.2 37.0 - 48.5 %    MCV 97 82 - 98 fL    MCH 31.7 (H) 27.0 - 31.0 pg    MCHC 32.5 32.0 - 36.0 g/dL    RDW 12.7 11.5 - 14.5 %    Platelets 355 150 - 450 K/uL    MPV 9.7 9.2 - 12.9 fL    Immature Granulocytes 0.3 0.0 - 0.5 %    Gran # (ANC) 6.2 1.8 - 7.7 K/uL    Immature Grans (Abs) 0.04 0.00 - 0.04 K/uL    Lymph # 3.6 1.0 - 4.8 K/uL    Mono # 0.9 0.3 - 1.0 K/uL    Eos # 3.5 (H) 0.0 - 0.5 K/uL    Baso # 0.11 0.00 - 0.20 K/uL    nRBC 0 0 /100 WBC    Gran % 42.9 38.0 - 73.0 %    Lymph % 25.3 18.0 - 48.0 %    Mono % 6.4 4.0 - 15.0 %    Eosinophil % 24.3 (H) 0.0 - 8.0 %    Basophil % 0.8 0.0 - 1.9 %    Differential Method Automated    Comprehensive metabolic panel    Collection Time: 11/26/24 12:47 AM   Result Value Ref Range    Sodium 139 136 - 145 mmol/L    Potassium 3.7 3.5 - 5.1 mmol/L    Chloride 108 95 - 110 mmol/L    CO2 21 (L) 23  - 29 mmol/L    Glucose 104 70 - 110 mg/dL    BUN 13 6 - 20 mg/dL    Creatinine 0.8 0.5 - 1.4 mg/dL    Calcium 9.1 8.7 - 10.5 mg/dL    Total Protein 7.5 6.0 - 8.4 g/dL    Albumin 3.8 3.5 - 5.2 g/dL    Total Bilirubin 0.4 0.1 - 1.0 mg/dL    Alkaline Phosphatase 64 40 - 150 U/L    AST 14 10 - 40 U/L    ALT 12 10 - 44 U/L    eGFR >60 >60 mL/min/1.73 m^2    Anion Gap 10 8 - 16 mmol/L   Hepatitis C Antibody    Collection Time: 11/26/24 12:47 AM   Result Value Ref Range    Hepatitis C Ab Negative Negative   HCV Virus Hold Specimen    Collection Time: 11/26/24 12:47 AM   Result Value Ref Range    HEP C Virus Hold Specimen Hold for HCV sendout    HIV 1/2 Ag/Ab (4th Gen)    Collection Time: 11/26/24 12:47 AM   Result Value Ref Range    HIV 1/2 Ag/Ab Negative Negative   Influenza A & B by Molecular    Collection Time: 11/26/24 12:53 AM    Specimen: Nasopharyngeal Swab   Result Value Ref Range    Influenza A, Molecular Negative Negative    Influenza B, Molecular Negative Negative    Flu A & B Source Nasal swab    COVID-19 Rapid Screening    Collection Time: 11/26/24 12:53 AM   Result Value Ref Range    SARS-CoV-2 RNA, Amplification, Qual Negative Negative        Imaging Results:  Imaging Results              X-Ray Chest AP Portable (Preliminary result)  Result time 11/26/24 01:37:33      Wet Read by Nba Scherer Jr., MD (11/26/24 01:37:33, [facility] - Emergency Dept., Emergency Medicine)    Hyperinflation.  No infiltrate.  No pneumothorax                                     The EKG was ordered, reviewed, and independently interpreted by the ED provider.  Interpretation time: 0:43  Rate: 95 BPM  Rhythm:  Sinus rhythm  with frequent premature ventricular complexes.   Interpretation: Right atrial enlargement. No STEMI.      The Emergency Provider reviewed the vital signs and test results, which are outlined above.     ED Discussion       1:43 AM: Discussed case with Dr. Maurice ([facility]). Dr. Maurice agrees with  current care and management of pt and accepts admission.   Admitting Service: Hospital Medicine  Admitting Physician: Dr. Maurice  Admit to: Obs med/tele     1:48 AM: Re-evaluated pt. I have discussed test results, shared treatment plan, and the need for admission with patient and family at bedside. Pt and family express understanding at this time and agree with all information. All questions answered. Pt and family have no further questions or concerns at this time. Pt is ready for admit.            Medical Decision Making  Differential diagnosis: Respiratory failure, hypoxia, pneumonia, flu, COVID, asthma    Patient was history of asthma.  Recently admitted.  She presents complaining of shortness a breath and wheezing.  She was diminished in all lung fields.  She was initially hypoxic in the 80s which improved with oxygen.  She was received multiple breathing treatments along with terbutaline steroids and a breathing has improved.  She was recently admitted.  Due to hypoxia of the patient was being admitted to Hospital Medicine further evaluation and treatment    Amount and/or Complexity of Data Reviewed  External Data Reviewed: notes.  Labs: ordered. Decision-making details documented in ED Course.     Details: Flu and COVID are negative.  He was he was going to hepatitis negative.  CMP is benign.  He was a 14,000 white count with a normal H&H  Radiology: ordered and independent interpretation performed. Decision-making details documented in ED Course.     Details: Hyperinflated with air trapping.  ECG/medicine tests: ordered and independent interpretation performed. Decision-making details documented in ED Course.     Details: No STEMI  Discussion of management or test interpretation with external provider(s): Discussed the case with hospital medicine graciously accepts for admission    Risk  OTC drugs.  Prescription drug management.  Drug therapy requiring intensive monitoring for toxicity.  Decision regarding  hospitalization.    Critical Care  Total time providing critical care: 35 minutes                ED Medication(s):  Medications   methylPREDNISolone sodium succinate injection 125 mg (125 mg Intravenous Given 11/26/24 0054)   predniSONE tablet 60 mg (60 mg Oral Given 11/26/24 0054)   terbutaline injection 0.25 mg (0.25 mg Subcutaneous Given 11/26/24 0054)   albuterol-ipratropium 2.5 mg-0.5 mg/3 mL nebulizer solution 3 mL (3 mLs Nebulization Given 11/26/24 0118)       New Prescriptions    No medications on file               Scribe Attestation:   Scribe #1: I performed the above scribed service and the documentation accurately describes the services I performed. I attest to the accuracy of the note.     Attending:   Physician Attestation Statement for Scribe #1: I, Nba Scherer Jr., MD, personally performed the services described in this documentation, as scribed by Shabbir Salamanca, in my presence, and it is both accurate and complete.           Clinical Impression       ICD-10-CM ICD-9-CM   1. Acute respiratory failure with hypoxia  J96.01 518.81   2. Exacerbation of asthma, unspecified asthma severity, unspecified whether persistent  J45.901 493.92       Disposition:   Disposition: Admitted  Condition: Nba Callahan Jr., MD  11/26/24 0148

## 2024-11-26 NOTE — PLAN OF CARE
Problem: Adult Inpatient Plan of Care  Goal: Plan of Care Review  Outcome: Progressing  Goal: Patient-Specific Goal (Individualized)  Outcome: Progressing  Goal: Absence of Hospital-Acquired Illness or Injury  Outcome: Progressing  Goal: Optimal Comfort and Wellbeing  Outcome: Progressing  Goal: Readiness for Transition of Care  Outcome: Progressing     Problem: Violence Risk or Actual  Goal: Anger and Impulse Control  Outcome: Progressing

## 2024-11-26 NOTE — ASSESSMENT & PLAN NOTE
Patient's COPD is with exacerbation noted by continued dyspnea and worsening of baseline hypoxia currently.  Patient is currently on Asthma Pathway. Continue scheduled inhalers  duonebs, Steroids, and Supplemental oxygen and monitor respiratory status closely.

## 2024-12-05 ENCOUNTER — OFFICE VISIT (OUTPATIENT)
Dept: ALLERGY | Facility: CLINIC | Age: 29
End: 2024-12-05
Payer: COMMERCIAL

## 2024-12-05 ENCOUNTER — LAB VISIT (OUTPATIENT)
Dept: LAB | Facility: HOSPITAL | Age: 29
End: 2024-12-05
Attending: STUDENT IN AN ORGANIZED HEALTH CARE EDUCATION/TRAINING PROGRAM
Payer: COMMERCIAL

## 2024-12-05 VITALS
WEIGHT: 130.75 LBS | HEART RATE: 71 BPM | SYSTOLIC BLOOD PRESSURE: 100 MMHG | OXYGEN SATURATION: 98 % | BODY MASS INDEX: 21.79 KG/M2 | TEMPERATURE: 98 F | DIASTOLIC BLOOD PRESSURE: 72 MMHG | HEIGHT: 65 IN

## 2024-12-05 DIAGNOSIS — J45.50 SEVERE PERSISTENT ASTHMA WITHOUT COMPLICATION: Primary | ICD-10-CM

## 2024-12-05 DIAGNOSIS — J45.50 SEVERE PERSISTENT ASTHMA WITHOUT COMPLICATION: ICD-10-CM

## 2024-12-05 DIAGNOSIS — J45.50 STEROID-DEPENDENT ASTHMA, SEVERE PERSISTENT, UNCOMPLICATED: ICD-10-CM

## 2024-12-05 DIAGNOSIS — J82.83 EOSINOPHILIC ASTHMA: ICD-10-CM

## 2024-12-05 DIAGNOSIS — J31.0 CHRONIC RHINITIS: ICD-10-CM

## 2024-12-05 LAB
BASOPHILS # BLD AUTO: 0.06 K/UL (ref 0–0.2)
BASOPHILS NFR BLD: 0.4 % (ref 0–1.9)
DIFFERENTIAL METHOD BLD: ABNORMAL
EOSINOPHIL # BLD AUTO: 0.3 K/UL (ref 0–0.5)
EOSINOPHIL NFR BLD: 2 % (ref 0–8)
ERYTHROCYTE [DISTWIDTH] IN BLOOD BY AUTOMATED COUNT: 12.8 % (ref 11.5–14.5)
FEF 25 75 LLN: 2.6
FEF 25 75 PRE REF: 12.4 %
FEF 25 75 REF: 4
FEV05 LLN: 1.47
FEV05 REF: 2.32
FEV1 FVC LLN: 74
FEV1 FVC PRE REF: 41.1 %
FEV1 FVC REF: 85
FEV1 LLN: 2.24
FEV1 PRE REF: 36.8 %
FEV1 REF: 2.86
FEV1FVCZSCORE: -4.48
FEV1ZSCORE: -4.54
FVC LLN: 2.64
FVC PRE REF: 89.1 %
FVC REF: 3.37
FVCZSCORE: -0.82
HCT VFR BLD AUTO: 48 % (ref 37–48.5)
HGB BLD-MCNC: 15.7 G/DL (ref 12–16)
IMM GRANULOCYTES # BLD AUTO: 0.05 K/UL (ref 0–0.04)
IMM GRANULOCYTES NFR BLD AUTO: 0.3 % (ref 0–0.5)
LYMPHOCYTES # BLD AUTO: 4.1 K/UL (ref 1–4.8)
LYMPHOCYTES NFR BLD: 24.6 % (ref 18–48)
MCH RBC QN AUTO: 31.8 PG (ref 27–31)
MCHC RBC AUTO-ENTMCNC: 32.7 G/DL (ref 32–36)
MCV RBC AUTO: 97 FL (ref 82–98)
MONOCYTES # BLD AUTO: 1.2 K/UL (ref 0.3–1)
MONOCYTES NFR BLD: 7.2 % (ref 4–15)
NEUTROPHILS # BLD AUTO: 11 K/UL (ref 1.8–7.7)
NEUTROPHILS NFR BLD: 65.5 % (ref 38–73)
NRBC BLD-RTO: 0 /100 WBC
PEF LLN: 4.96
PEF PRE REF: 19.8 %
PEF REF: 7.03
PLATELET # BLD AUTO: 410 K/UL (ref 150–450)
PMV BLD AUTO: 10.9 FL (ref 9.2–12.9)
PRE FEF 25 75: 0.5 L/S (ref 2.6–5.4)
PRE FET 100: 7.95 SEC
PRE FEV05 REF: 27.3 %
PRE FEV1 FVC: 35.09 % (ref 74.16–94.21)
PRE FEV1: 1.05 L (ref 2.24–3.47)
PRE FEV5: 0.63 L (ref 1.47–3.18)
PRE FVC: 3 L (ref 2.64–4.13)
PRE PEF: 1.39 L/S (ref 4.96–9.1)
RBC # BLD AUTO: 4.93 M/UL (ref 4–5.4)
WBC # BLD AUTO: 16.76 K/UL (ref 3.9–12.7)

## 2024-12-05 PROCEDURE — 85025 COMPLETE CBC W/AUTO DIFF WBC: CPT | Performed by: STUDENT IN AN ORGANIZED HEALTH CARE EDUCATION/TRAINING PROGRAM

## 2024-12-05 PROCEDURE — 36415 COLL VENOUS BLD VENIPUNCTURE: CPT | Performed by: STUDENT IN AN ORGANIZED HEALTH CARE EDUCATION/TRAINING PROGRAM

## 2024-12-05 PROCEDURE — 86003 ALLG SPEC IGE CRUDE XTRC EA: CPT | Mod: 59 | Performed by: STUDENT IN AN ORGANIZED HEALTH CARE EDUCATION/TRAINING PROGRAM

## 2024-12-05 PROCEDURE — 99999 PR PBB SHADOW E&M-EST. PATIENT-LVL IV: CPT | Mod: PBBFAC,,, | Performed by: STUDENT IN AN ORGANIZED HEALTH CARE EDUCATION/TRAINING PROGRAM

## 2024-12-05 RX ORDER — PREDNISONE 10 MG/1
TABLET ORAL
Qty: 49 TABLET | Refills: 0 | Status: SHIPPED | OUTPATIENT
Start: 2024-12-05 | End: 2024-12-26

## 2024-12-05 RX ORDER — MONTELUKAST SODIUM 10 MG/1
10 TABLET ORAL NIGHTLY
Qty: 90 TABLET | Refills: 3 | Status: SHIPPED | OUTPATIENT
Start: 2024-12-05 | End: 2025-12-05

## 2024-12-05 RX ORDER — FLUTICASONE FUROATE, UMECLIDINIUM BROMIDE AND VILANTEROL TRIFENATATE 200; 62.5; 25 UG/1; UG/1; UG/1
1 POWDER RESPIRATORY (INHALATION) DAILY
Qty: 60 EACH | Refills: 11 | Status: SHIPPED | OUTPATIENT
Start: 2024-12-05 | End: 2025-12-05

## 2024-12-06 ENCOUNTER — PATIENT MESSAGE (OUTPATIENT)
Dept: PULMONOLOGY | Facility: CLINIC | Age: 29
End: 2024-12-06
Payer: COMMERCIAL

## 2024-12-06 NOTE — PROGRESS NOTES
Allergy and Immunology  Procedure Note     Spirometry  Date FEV1 (L)  (% predicted) FVC (L)  (% predicted) FEV1/ FVC WTP57-64%  (% predicted)   12/05/2024 37% 89% 35% 12%                 Interpretation: Obstructive ratio. FEV1 consistent with severe obstruction.   Of note, patient on oral steroids at time of this spirometry.     Bennie Dumas MD   [facility]  Allergy and Clinical Immunology

## 2024-12-06 NOTE — PROGRESS NOTES
Allergy and Immunology  New Patient Clinic Note    Date: 12/5/2024  Chief Complaint   Patient presents with    Asthma    Breathing Problem     Referred by: Oliver Rodriguez MD  [address] [place] [id number]    History  Georgina Interiano is a 28 y.o. female being seen as a New Patient today.    Chronic Rhinitis   - Onset: Childhood with persistence into adulthood   - Symptoms: Congestion, rhinorrhea, some sneezing  - Suspected triggers include: Environmental   - Pattern: Perennial  - Medications: PRN antihistamines in the past     Chronic or Inducible Urticaria  - No hx of chronic urticaria     Severe Persistent Eosinophilic Asthma  Steroid-Dependent Asthma    - Onset: Childhood with interval worsening in adulthood   - Symptoms: SOB, chest tightness, wheezing, coughing, resp distress  - Medications: Trelegy, prior Symbicort/Spiriva, albuterol PRN    - Patient reported using albuterol inhaler and nebulizer multiple times per day constantly   - PO Steroids: Yes - multiple rounds each year - at least 4 rounds in 2024   - Hospitalization/Intubation: Yes - no hx of intubation    - Multiple admission/ED visits for acute hypoxic resp failure   - Suspected triggers include: environmental, immune, infectious  - Smoking: No   - ASA/NSAID use: No   - Hx of CRSwNP:  No   - Control/Albuterol Use: Not controlled, high risk patient    - Uses albuterol 3-4 times daily consistently  - Nocturnal symptoms: Yes - frequently awakens     CRSwNP  - No hx of CRSwNP     Eczema   - No hx of eczema     Eosinophilic Esophagitis  - No hx of eosinophilic esophagitis     Adverse Food Reaction  - No hx of food allergy     Adverse Drug Reaction  - No hx of drug allergy     Recurrent Infections  - No hx of recurrent infections     Venom Allergy  - No hx of venom allergy     Allergies, PMH, PSH, Social, and Family History were reviewed.    Review of patient's allergies indicates:  No Known Allergies   Past Medical History:    Diagnosis Date    Asthma      No past surgical history on file.  Social History     Social History Narrative    Not on file     S/he reports that she has never smoked. She has never used smokeless tobacco. She reports that she does not drink alcohol and does not use drugs.    Current Outpatient Medications on File Prior to Visit   Medication Sig Dispense Refill    albuterol (PROVENTIL/VENTOLIN HFA) 90 mcg/actuation inhaler Inhale 1-2 puffs into the lungs every 6 (six) hours as needed for Wheezing. Rescue 18 g 3     No current facility-administered medications on file prior to visit.     Physical Examination  Vitals:    12/05/24 1109   BP: 100/72   Pulse: 71   Temp: 98.2 °F (36.8 °C)     GENERAL:  female in no apparent distress and well developed and well nourished  HEAD:  Normocephalic, without obvious abnormality, atraumatic  EYES: sclera anicteric, conjunctiva normochromic  EARS: normal TM's and external ear canals both ears  NOSE: without erythema or discharge, clear discharge, turbinates pink, swollen    OROPHARYNX: moist mucous membranes without erythema, exudates or petechiae, clear post-nasal drainage present  LYMPH NODES: normal, supple, no lymphadenopathy  LUNGS: clear to auscultation, no wheezes, rales or rhonchi, symmetric air entry.  HEART: normal rate, regular rhythm, normal S1, S2, no murmurs, rubs, clicks or gallops.  ABDOMEN: soft, nontender, nondistended, no masses or organomegaly.  MUSCULOSKELETAL: no gross joint deformity or swelling.  NEURO: alert, oriented, normal speech, no focal findings or movement disorder noted.  SKIN: normal coloration and turgor, no rashes, no suspicious skin lesions noted.     Assessment/Plan:   Problem List Items Addressed This Visit       Severe persistent asthma without complication - Primary    Overview     - High risk patient  - Patient with multiple ED visits per year   - Patient with multiple rounds of oral and IV/IM steroids per year      - 11/26/2024: ED visit  for Asthma Exacerbation- Acute Respiratory Failure   - 05/17/2024: ED visit for Asthma Exacerbation  - 01/20/2024: ED visit for Asthma Exacerbation         Current Assessment & Plan     - Not controlled, recent steroid with lose of control again   - Ordered Trelegy 200 mcg daily   - Ordered Airsupra PRN   - Ordered Montelukast 10 mg daily   - Escalation to biologic essential for safety at this time   - Ordered Dupixent with loading dose   - Educated on proper use of inhalers including an in-person demonstration of proper technique  - Expressed understanding of demonstrated technique  - ED precautions discussed at length   - Will continue to monitor and reassess         Relevant Medications    fluticasone-umeclidin-vilanter (TRELEGY ELLIPTA) 200-62.5-25 mcg inhaler    albuterol-budesonide (AIRSUPRA) 90-80 mcg/actuation    dupilumab (DUPIXENT) 300 mg/2 mL Syrg    dupilumab (DUPIXENT) 300 mg/2 mL Syrg    predniSONE (DELTASONE) 10 MG tablet    montelukast (SINGULAIR) 10 mg tablet    Other Relevant Orders    Spirometry without Bronchodilator (Completed)    CBC Auto Differential    Allergen Profile, Zone 6    Ambulatory referral/consult to Pulmonology    Eosinophilic asthma    Relevant Medications    fluticasone-umeclidin-vilanter (TRELEGY ELLIPTA) 200-62.5-25 mcg inhaler    albuterol-budesonide (AIRSUPRA) 90-80 mcg/actuation    dupilumab (DUPIXENT) 300 mg/2 mL Syrg    dupilumab (DUPIXENT) 300 mg/2 mL Syrg    predniSONE (DELTASONE) 10 MG tablet    montelukast (SINGULAIR) 10 mg tablet    Other Relevant Orders    Spirometry without Bronchodilator (Completed)    CBC Auto Differential    Allergen Profile, Zone 6    Ambulatory referral/consult to Pulmonology    Steroid-dependent asthma, severe persistent, uncomplicated    Relevant Medications    fluticasone-umeclidin-vilanter (TRELEGY ELLIPTA) 200-62.5-25 mcg inhaler    albuterol-budesonide (AIRSUPRA) 90-80 mcg/actuation    dupilumab (DUPIXENT) 300 mg/2 mL Syrg    dupilumab  (DUPIXENT) 300 mg/2 mL Syrg    predniSONE (DELTASONE) 10 MG tablet    montelukast (SINGULAIR) 10 mg tablet    Other Relevant Orders    Spirometry without Bronchodilator (Completed)    CBC Auto Differential    Allergen Profile, Zone 6    Ambulatory referral/consult to Pulmonology    Chronic rhinitis    Relevant Medications    montelukast (SINGULAIR) 10 mg tablet    Other Relevant Orders    CBC Auto Differential    Allergen Profile, Zone 6     Follow up:  Follow up in about 6 weeks (around 1/16/2025).    Greater than 60 minutes spent on the complex healthcare of this high risk patient.   Additional timing for education as well as direct and indirect patient care.     Bennie Dumas MD   [facility]  Allergy and Immunology

## 2024-12-06 NOTE — ASSESSMENT & PLAN NOTE
- Not controlled, recent steroid with lose of control again   - Ordered Trelegy 200 mcg daily   - Ordered Airsupra PRN   - Ordered Montelukast 10 mg daily   - Escalation to biologic essential for safety at this time   - Ordered Dupixent with loading dose   - Educated on proper use of inhalers including an in-person demonstration of proper technique  - Expressed understanding of demonstrated technique  - ED precautions discussed at length   - Will continue to monitor and reassess

## 2024-12-10 LAB
A ALTERNATA IGE QN: <0.1 KU/L
A FUMIGATUS IGE QN: <0.1 KU/L
ALLERGEN BOXELDER MAPLE TREE IGE: <0.1 KU/L
ALLERGEN MULBERRY TREE IGE: <0.1 KU/L
ALLERGEN PIGWEED IGE: <0.1 KU/L
ALLERGEN WALNUT TREE IGE: <0.1 KU/L
BERMUDA GRASS IGE QN: <0.1 KU/L
C HERBARUM IGE QN: <0.1 KU/L
CAT DANDER IGE QN: <0.1 KU/L
COMMON RAGWEED IGE QN: <0.1 KU/L
D FARINAE IGE QN: <0.1 KU/L
D PTERONYSS IGE QN: <0.1 KU/L
DEPRECATED TIMOTHY IGE RAST QL: ABNORMAL
DOG DANDER IGE QN: <0.1 KU/L
ELDER IGE QN: <0.1 KU/L
IGE: 458 IU/ML
MOUSE URINE PROT IGE QN: <0.1 KU/L
MT JUNIPER IGE QN: <0.1 KU/L
P NOTATUM IGE QN: <0.1 KU/L
PECAN/HICK TREE IGE QN: <0.1 KU/L
RAST ALLERGEN INTERPRETATION: ABNORMAL
RAST CLASS: ABNORMAL
ROACH IGE QN: <0.1 KU/L
SILVER BIRCH IGE QN: <0.1 KU/L
TIMOTHY IGE QN: <0.1 KU/L
WHITE ELM IGE QN: <0.1 KU/L
WHITE OAK IGE QN: <0.1 KU/L

## 2025-01-16 ENCOUNTER — OFFICE VISIT (OUTPATIENT)
Dept: ALLERGY | Facility: CLINIC | Age: 30
End: 2025-01-16
Payer: COMMERCIAL

## 2025-01-16 VITALS
BODY MASS INDEX: 22.85 KG/M2 | TEMPERATURE: 98 F | HEIGHT: 65 IN | DIASTOLIC BLOOD PRESSURE: 67 MMHG | WEIGHT: 137.13 LBS | OXYGEN SATURATION: 98 % | HEART RATE: 90 BPM | SYSTOLIC BLOOD PRESSURE: 108 MMHG

## 2025-01-16 DIAGNOSIS — J31.0 CHRONIC NONALLERGIC RHINITIS: ICD-10-CM

## 2025-01-16 DIAGNOSIS — J82.83 EOSINOPHILIC ASTHMA: ICD-10-CM

## 2025-01-16 DIAGNOSIS — J45.50 SEVERE PERSISTENT ASTHMA WITHOUT COMPLICATION: Primary | ICD-10-CM

## 2025-01-16 DIAGNOSIS — J45.50 STEROID-DEPENDENT ASTHMA, SEVERE PERSISTENT, UNCOMPLICATED: ICD-10-CM

## 2025-01-16 PROCEDURE — 1160F RVW MEDS BY RX/DR IN RCRD: CPT | Mod: CPTII,S$GLB,, | Performed by: STUDENT IN AN ORGANIZED HEALTH CARE EDUCATION/TRAINING PROGRAM

## 2025-01-16 PROCEDURE — 3074F SYST BP LT 130 MM HG: CPT | Mod: CPTII,S$GLB,, | Performed by: STUDENT IN AN ORGANIZED HEALTH CARE EDUCATION/TRAINING PROGRAM

## 2025-01-16 PROCEDURE — 3078F DIAST BP <80 MM HG: CPT | Mod: CPTII,S$GLB,, | Performed by: STUDENT IN AN ORGANIZED HEALTH CARE EDUCATION/TRAINING PROGRAM

## 2025-01-16 PROCEDURE — 1159F MED LIST DOCD IN RCRD: CPT | Mod: CPTII,S$GLB,, | Performed by: STUDENT IN AN ORGANIZED HEALTH CARE EDUCATION/TRAINING PROGRAM

## 2025-01-16 PROCEDURE — 99999 PR PBB SHADOW E&M-EST. PATIENT-LVL IV: CPT | Mod: PBBFAC,,, | Performed by: STUDENT IN AN ORGANIZED HEALTH CARE EDUCATION/TRAINING PROGRAM

## 2025-01-16 PROCEDURE — 3008F BODY MASS INDEX DOCD: CPT | Mod: CPTII,S$GLB,, | Performed by: STUDENT IN AN ORGANIZED HEALTH CARE EDUCATION/TRAINING PROGRAM

## 2025-01-16 PROCEDURE — 99214 OFFICE O/P EST MOD 30 MIN: CPT | Mod: S$GLB,,, | Performed by: STUDENT IN AN ORGANIZED HEALTH CARE EDUCATION/TRAINING PROGRAM

## 2025-01-16 NOTE — PROGRESS NOTES
Allergy and Immunology  Established Patient Clinic Note    Date: 1/16/2025  Chief Complaint   Patient presents with    Follow-up     History  Georgina Interiano is a 29 y.o. female being seen for follow-up today.    Chronic Nonallergic Rhinitis   - Interval improvement since prior appointment     Severe Persistent Eosinophilic Asthma  Steroid-Dependent Asthma   - Some improvement since Trelegy but continued issues   - Reorder Dupixent at next appointment if continued issues   - ED precautions discussed at length      Initial HPI:    - Onset: Childhood with interval worsening in adulthood   - Symptoms: SOB, chest tightness, wheezing, coughing, resp distress  - Medications: Trelegy, prior Symbicort/Spiriva, albuterol PRN               - Patient reported using albuterol inhaler and nebulizer multiple times per day constantly   - PO Steroids: Yes - multiple rounds each year - at least 4 rounds in 2024   - Hospitalization/Intubation: Yes - no hx of intubation               - Multiple admission/ED visits for acute hypoxic resp failure   - Suspected triggers include: environmental, immune, infectious  - Smoking: No   - ASA/NSAID use: No   - Hx of CRSwNP:  No   - Control/Albuterol Use: Not controlled, high risk patient               - Uses albuterol 3-4 times daily consistently  - Nocturnal symptoms: Yes - frequently awakens      Allergies, PMH, PSH, Social, and Family History were reviewed.    Current Outpatient Medications on File Prior to Visit   Medication Sig Dispense Refill    albuterol (PROVENTIL/VENTOLIN HFA) 90 mcg/actuation inhaler Inhale 1-2 puffs into the lungs every 6 (six) hours as needed for Wheezing. Rescue 18 g 3    albuterol-budesonide (AIRSUPRA) 90-80 mcg/actuation Inhale 2 puffs into the lungs every 4 (four) hours as needed (Shortness of Breath and/or wheezing). 10.7 g 11    fluticasone-umeclidin-vilanter (TRELEGY ELLIPTA) 200-62.5-25 mcg inhaler Inhale 1 puff into the lungs once daily. 60 each 11     montelukast (SINGULAIR) 10 mg tablet Take 1 tablet (10 mg total) by mouth every evening. 90 tablet 3     No current facility-administered medications on file prior to visit.     Physical Examination  Vitals:    01/16/25 1041   BP: 108/67   Pulse: 90   Temp: 97.8 °F (36.6 °C)     GENERAL:  female in no apparent distress and well developed and well nourished  HEAD:  Normocephalic, without obvious abnormality, atraumatic  EYES: sclera anicteric, conjunctiva normochromic  EARS: normal TM's and external ear canals both ears  NOSE: without erythema or discharge, clear discharge, turbinates normal    OROPHARYNX: moist mucous membranes without erythema, exudates or petechiae  LYMPH NODES: normal, supple, no lymphadenopathy  LUNGS: clear to auscultation, no wheezes, rales or rhonchi, symmetric air entry.  HEART: normal rate, regular rhythm, normal S1, S2, no murmurs, rubs, clicks or gallops.  ABDOMEN: soft, nontender, nondistended, no masses or organomegaly.  MUSCULOSKELETAL: no gross joint deformity or swelling.  NEURO: alert, oriented, normal speech, no focal findings or movement disorder noted.  SKIN: normal coloration and turgor, no rashes, no suspicious skin lesions noted.     Assessment/Plan:   Problem List Items Addressed This Visit       Severe persistent asthma without complication - Primary    Overview     - High risk patient  - Patient with multiple ED visits per year   - Patient with multiple rounds of oral and IV/IM steroids per year      - 12/05/2024: Serum IgE to Zone 6 Aeroallergens negative; Serum IgE elevated at 458  - 11/26/2024: ED visit for Asthma Exacerbation- Acute Respiratory Failure   - 05/17/2024: ED visit for Asthma Exacerbation  - 01/20/2024: ED visit for Asthma Exacerbation         Eosinophilic asthma    Steroid-dependent asthma, severe persistent, uncomplicated    Chronic nonallergic rhinitis    Overview     - 12/05/2024: Serum IgE to Zone 6 Aeroallergens negative; Serum IgE elevated at 458           - VINCENT controlled with no acute complaints   - Severe eos asthma - high risk patient   - Trelegy with interval improvement though not controlled   - Strongly consider Dupixent at next appointment   - ED precautions discussed at length     Follow up:  Follow up in about 4 weeks (around 2/13/2025).    Kesler Bourgoyne, MD [facility]  Allergy and Immunology

## 2025-01-26 ENCOUNTER — HOSPITAL ENCOUNTER (EMERGENCY)
Facility: HOSPITAL | Age: 30
Discharge: HOME | End: 2025-01-26
Attending: EMERGENCY MEDICINE
Payer: COMMERCIAL

## 2025-01-26 VITALS
HEART RATE: 118 BPM | BODY MASS INDEX: 22.8 KG/M2 | TEMPERATURE: 98 F | DIASTOLIC BLOOD PRESSURE: 67 MMHG | RESPIRATION RATE: 18 BRPM | WEIGHT: 136.81 LBS | SYSTOLIC BLOOD PRESSURE: 109 MMHG | HEIGHT: 65 IN | OXYGEN SATURATION: 98 %

## 2025-01-26 DIAGNOSIS — R07.9 CHEST PAIN: ICD-10-CM

## 2025-01-26 DIAGNOSIS — J10.1 INFLUENZA A: Primary | ICD-10-CM

## 2025-01-26 DIAGNOSIS — R07.9 CHEST PAIN AT REST: ICD-10-CM

## 2025-01-26 LAB
ANION GAP SERPL CALC-SCNC: 13 MMOL/L (ref 8–16)
BASOPHILS # BLD AUTO: 0.03 K/UL (ref 0–0.2)
BASOPHILS NFR BLD: 0.3 % (ref 0–1.9)
BUN SERPL-MCNC: 10 MG/DL (ref 6–20)
CALCIUM SERPL-MCNC: 8.5 MG/DL (ref 8.7–10.5)
CHLORIDE SERPL-SCNC: 102 MMOL/L (ref 95–110)
CO2 SERPL-SCNC: 22 MMOL/L (ref 23–29)
CREAT SERPL-MCNC: 1 MG/DL (ref 0.5–1.4)
DIFFERENTIAL METHOD BLD: ABNORMAL
EOSINOPHIL # BLD AUTO: 0.1 K/UL (ref 0–0.5)
EOSINOPHIL NFR BLD: 0.7 % (ref 0–8)
ERYTHROCYTE [DISTWIDTH] IN BLOOD BY AUTOMATED COUNT: 12.6 % (ref 11.5–14.5)
EST. GFR  (NO RACE VARIABLE): >60 ML/MIN/1.73 M^2
GLUCOSE SERPL-MCNC: 171 MG/DL (ref 70–110)
HCT VFR BLD AUTO: 41.4 % (ref 37–48.5)
HGB BLD-MCNC: 13.5 G/DL (ref 12–16)
IMM GRANULOCYTES # BLD AUTO: 0.02 K/UL (ref 0–0.04)
IMM GRANULOCYTES NFR BLD AUTO: 0.2 % (ref 0–0.5)
INFLUENZA A, MOLECULAR: POSITIVE
INFLUENZA B, MOLECULAR: NEGATIVE
LYMPHOCYTES # BLD AUTO: 1.7 K/UL (ref 1–4.8)
LYMPHOCYTES NFR BLD: 18.6 % (ref 18–48)
MCH RBC QN AUTO: 31.3 PG (ref 27–31)
MCHC RBC AUTO-ENTMCNC: 32.6 G/DL (ref 32–36)
MCV RBC AUTO: 96 FL (ref 82–98)
MONOCYTES # BLD AUTO: 0.9 K/UL (ref 0.3–1)
MONOCYTES NFR BLD: 9.9 % (ref 4–15)
NEUTROPHILS # BLD AUTO: 6.4 K/UL (ref 1.8–7.7)
NEUTROPHILS NFR BLD: 70.3 % (ref 38–73)
NRBC BLD-RTO: 0 /100 WBC
PLATELET # BLD AUTO: 296 K/UL (ref 150–450)
PMV BLD AUTO: 10 FL (ref 9.2–12.9)
POTASSIUM SERPL-SCNC: 3.2 MMOL/L (ref 3.5–5.1)
RBC # BLD AUTO: 4.31 M/UL (ref 4–5.4)
SARS-COV-2 RDRP RESP QL NAA+PROBE: NEGATIVE
SODIUM SERPL-SCNC: 137 MMOL/L (ref 136–145)
SPECIMEN SOURCE: ABNORMAL
WBC # BLD AUTO: 9.17 K/UL (ref 3.9–12.7)

## 2025-01-26 PROCEDURE — 25000003 PHARM REV CODE 250

## 2025-01-26 PROCEDURE — 80048 BASIC METABOLIC PNL TOTAL CA: CPT

## 2025-01-26 PROCEDURE — 93010 ELECTROCARDIOGRAM REPORT: CPT | Mod: ,,, | Performed by: INTERNAL MEDICINE

## 2025-01-26 PROCEDURE — 85025 COMPLETE CBC W/AUTO DIFF WBC: CPT

## 2025-01-26 PROCEDURE — 25000242 PHARM REV CODE 250 ALT 637 W/ HCPCS

## 2025-01-26 PROCEDURE — 96372 THER/PROPH/DIAG INJ SC/IM: CPT

## 2025-01-26 PROCEDURE — 87502 INFLUENZA DNA AMP PROBE: CPT | Performed by: EMERGENCY MEDICINE

## 2025-01-26 PROCEDURE — 87635 SARS-COV-2 COVID-19 AMP PRB: CPT | Performed by: EMERGENCY MEDICINE

## 2025-01-26 PROCEDURE — 99285 EMERGENCY DEPT VISIT HI MDM: CPT | Mod: 25

## 2025-01-26 PROCEDURE — 63600175 PHARM REV CODE 636 W HCPCS: Mod: JZ,TB

## 2025-01-26 PROCEDURE — 93005 ELECTROCARDIOGRAM TRACING: CPT

## 2025-01-26 PROCEDURE — 94640 AIRWAY INHALATION TREATMENT: CPT

## 2025-01-26 RX ORDER — ACETAMINOPHEN 500 MG
1000 TABLET ORAL
Status: COMPLETED | OUTPATIENT
Start: 2025-01-26 | End: 2025-01-26

## 2025-01-26 RX ORDER — METHYLPREDNISOLONE SOD SUCC 125 MG
125 VIAL (EA) INJECTION
Status: COMPLETED | OUTPATIENT
Start: 2025-01-26 | End: 2025-01-26

## 2025-01-26 RX ORDER — IPRATROPIUM BROMIDE AND ALBUTEROL SULFATE 2.5; .5 MG/3ML; MG/3ML
3 SOLUTION RESPIRATORY (INHALATION)
Status: COMPLETED | OUTPATIENT
Start: 2025-01-26 | End: 2025-01-26

## 2025-01-26 RX ORDER — PROMETHAZINE HYDROCHLORIDE AND DEXTROMETHORPHAN HYDROBROMIDE 6.25; 15 MG/5ML; MG/5ML
5 SYRUP ORAL EVERY 4 HOURS PRN
Qty: 150 ML | Refills: 0 | Status: SHIPPED | OUTPATIENT
Start: 2025-01-26 | End: 2025-02-05

## 2025-01-26 RX ORDER — METHYLPREDNISOLONE 4 MG/1
TABLET ORAL
Qty: 21 EACH | Refills: 0 | Status: SHIPPED | OUTPATIENT
Start: 2025-01-26 | End: 2025-02-16

## 2025-01-26 RX ORDER — OSELTAMIVIR PHOSPHATE 75 MG/1
75 CAPSULE ORAL 2 TIMES DAILY
Qty: 10 CAPSULE | Refills: 0 | Status: SHIPPED | OUTPATIENT
Start: 2025-01-26 | End: 2025-01-31

## 2025-01-26 RX ADMIN — IPRATROPIUM BROMIDE AND ALBUTEROL SULFATE 3 ML: 2.5; .5 SOLUTION RESPIRATORY (INHALATION) at 08:01

## 2025-01-26 RX ADMIN — METHYLPREDNISOLONE SODIUM SUCCINATE 125 MG: 125 INJECTION, POWDER, FOR SOLUTION INTRAMUSCULAR; INTRAVENOUS at 09:01

## 2025-01-26 RX ADMIN — ACETAMINOPHEN 1000 MG: 500 TABLET ORAL at 09:01

## 2025-01-26 NOTE — Clinical Note
"Georgina Rodrigueztani Interiano was seen and treated in our emergency department on 1/26/2025.  She may return to work on 01/30/2025.       If you have any questions or concerns, please don't hesitate to call.      Diana Jack PA-C"

## 2025-01-27 LAB
OHS QRS DURATION: 70 MS
OHS QTC CALCULATION: 333 MS

## 2025-01-27 NOTE — ED PROVIDER NOTES
Encounter Date: 1/26/2025       History     Chief Complaint   Patient presents with    Chest Pain     Pt complaining of chest pain and shortness of breath x3 days. Pt also having dry and productive cough. Hx asthma with no relief from duo nebs    Shortness of Breath     29-year-old female with past medical history of asthma presenting to the emergency department with complaints of pleuritic chest pain and shortness of breath x3 days.  Patient reports also having a dry cough.  She has been using duo nebs to treat her symptoms.  Reports mild relief of symptoms; feels as if she is still wheezing and having trouble moving air.  Concerned that she may have pneumonia.  Denies fever, chills, palpitations, abdominal pain, nausea, vomiting, diarrhea, dysuria, body aches, neck pain, weakness, dizziness, and all other symptoms at this time.    The history is provided by the patient.     Review of patient's allergies indicates:  No Known Allergies  Past Medical History:   Diagnosis Date    Asthma      History reviewed. No pertinent surgical history.  No family history on file.  Social History     Tobacco Use    Smoking status: Never    Smokeless tobacco: Never   Substance Use Topics    Alcohol use: No    Drug use: No     Review of Systems   Constitutional:  Positive for fever. Negative for chills.   HENT:  Negative for congestion, drooling, ear pain, postnasal drip, rhinorrhea, sinus pressure, sinus pain, sore throat and trouble swallowing.    Respiratory:  Positive for choking, chest tightness, shortness of breath and wheezing.    Cardiovascular:  Negative for chest pain.   Gastrointestinal:  Negative for abdominal pain, diarrhea, nausea and vomiting.   Genitourinary:  Negative for dysuria.   Musculoskeletal:  Negative for back pain, myalgias and neck pain.   Skin:  Negative for rash.   Neurological:  Negative for dizziness, weakness, light-headedness and headaches.   Hematological:  Does not bruise/bleed easily.   All other  systems reviewed and are negative.      Physical Exam     Initial Vitals [01/26/25 1957]   BP Pulse Resp Temp SpO2   120/67 (!) 130 18 100.1 °F (37.8 °C) 97 %      MAP       --         Physical Exam    Nursing note reviewed.  Constitutional: She appears well-developed and well-nourished.  Non-toxic appearance. She does not have a sickly appearance. She does not appear ill. No distress.   HENT:   Head: Normocephalic and atraumatic.   Right Ear: Hearing and external ear normal.   Left Ear: Hearing and external ear normal.   Nose: Nose normal. Mouth/Throat: Uvula is midline and oropharynx is clear and moist. No oropharyngeal exudate, posterior oropharyngeal edema or posterior oropharyngeal erythema.   Eyes: Conjunctivae, EOM and lids are normal. Pupils are equal, round, and reactive to light.   Neck: Trachea normal. Neck supple.   Normal range of motion.   Full passive range of motion without pain.     Cardiovascular:  Normal rate, regular rhythm, normal heart sounds, intact distal pulses and normal pulses.           Pulmonary/Chest: Effort normal. No respiratory distress. She has wheezes in the right upper field and the right lower field. She has no rhonchi. She has no rales. She exhibits no tenderness.   Abdominal: Abdomen is soft. Bowel sounds are normal. There is no abdominal tenderness. There is no rebound and no guarding.   Musculoskeletal:         General: Normal range of motion.      Cervical back: Normal, full passive range of motion without pain, normal range of motion and neck supple.     Neurological: She is alert and oriented to person, place, and time. She has normal strength and normal reflexes. No cranial nerve deficit or sensory deficit. GCS eye subscore is 4. GCS verbal subscore is 5. GCS motor subscore is 6.   Skin: Skin is warm and dry.   Psychiatric: She has a normal mood and affect. Her behavior is normal. Thought content normal.         ED Course   Procedures  Labs Reviewed   INFLUENZA A & B BY  MOLECULAR - Abnormal       Result Value    Influenza A, Molecular Positive (*)     Influenza B, Molecular Negative      Flu A & B Source Nasal swab     CBC W/ AUTO DIFFERENTIAL - Abnormal    WBC 9.17      RBC 4.31      Hemoglobin 13.5      Hematocrit 41.4      MCV 96      MCH 31.3 (*)     MCHC 32.6      RDW 12.6      Platelets 296      MPV 10.0      Immature Granulocytes 0.2      Gran # (ANC) 6.4      Immature Grans (Abs) 0.02      Lymph # 1.7      Mono # 0.9      Eos # 0.1      Baso # 0.03      nRBC 0      Gran % 70.3      Lymph % 18.6      Mono % 9.9      Eosinophil % 0.7      Basophil % 0.3      Differential Method Automated     BASIC METABOLIC PANEL - Abnormal    Sodium 137      Potassium 3.2 (*)     Chloride 102      CO2 22 (*)     Glucose 171 (*)     BUN 10      Creatinine 1.0      Calcium 8.5 (*)     Anion Gap 13      eGFR >60     SARS-COV-2 RNA AMPLIFICATION, QUAL    SARS-CoV-2 RNA, Amplification, Qual Negative       EKG Readings: (Independently Interpreted)   Initial Reading: No STEMI. Rhythm: Sinus Tachycardia. Heart Rate: 118.   Sinus tachycardia   Nonspecific ST and T-wave abnormality     ECG Results              EKG 12-lead (In process)        Collection Time Result Time QRS Duration [facility] QTC Calculation    01/26/25 19:52:52 01/26/25 21:43:01 70 333                     In process by Interface, Lab In [facility] (01/26/25 21:43:10)                   Narrative:    Test Reason : R07.9,    Vent. Rate : 118 BPM     Atrial Rate : 118 BPM     P-R Int : 124 ms          QRS Dur :  70 ms      QT Int : 238 ms       P-R-T Axes :  84  88  12 degrees    QTcB Int : 333 ms    Sinus tachycardia  Nonspecific ST and T wave abnormality  Abnormal ECG  When compared with ECG of 26-Nov-2024 00:43,  Premature ventricular complexes are no longer Present  ST now depressed in Inferior leads  Nonspecific T wave abnormality now evident in Inferior leads    Referred By: AAAREFERRAL SELF           Confirmed By:                                    Imaging Results              X-Ray Chest 1 View (Final result)  Result time 01/26/25 20:30:43      Final result by Latrell Garland MD (01/26/25 20:30:43)                   Impression:      No acute abnormality.      Electronically signed by: Latrell Garland  Date:    01/26/2025  Time:    20:30               Narrative:    EXAMINATION:  XR CHEST 1 VIEW    CLINICAL HISTORY:  Chest pain, unspecified    TECHNIQUE:  Single frontal view of the chest was performed.    COMPARISON:  None    FINDINGS:  The lungs are clear, with normal appearance of pulmonary vasculature and no pleural effusion or pneumothorax.    The cardiac silhouette is normal in size. The hilar and mediastinal contours are unremarkable.    Bones are intact.                                       Medications   albuterol-ipratropium 2.5 mg-0.5 mg/3 mL nebulizer solution 3 mL (3 mLs Nebulization Given 1/26/25 2015)   acetaminophen tablet 1,000 mg (1,000 mg Oral Given 1/26/25 2105)   methylPREDNISolone sodium succinate injection 125 mg (125 mg Intramuscular Given 1/26/25 2122)     Medical Decision Making  Differential diagnosis to include but not limited to:  Asthma exacerbation, flu, COVID, bronchitis, viral URI, pneumonia.     Amount and/or Complexity of Data Reviewed  Labs: ordered.     Details: Influenza A positive  Potassium 3.2   Calcium 3.5   Glucose 171  Radiology:      Details: Chest x-ray negative for acute abnormalities.    Risk  OTC drugs.  Prescription drug management.  Risk Details: Patient presents with upper respiratory and flulike symptoms consistent with a viral etiology. Based on my assessment in the ED, I do not suspect any respiratory, airway, pulmonary, cardiovascular (including myocarditis), metabolic, CNS, medical, or surgical emergency medical condition. I have discussed with the patient and/or caregiver signs and symptoms for secondary bacterial infections, such as pneumonia. I believe that the patient's symptoms are most  consistent with a viral illness. Patient is safe for discharge home with conservative therapy.  I recommended that the patient treat the symptoms and recommended that they:  Rest; drink plenty of clear fluids; use nasal saline spray to clear nasal drainage and help with nasal congestion; take an antihistamine (Allegra, Claritin, or Zyrtec) to help dry mucus and postnasal drip; take Mucinex or Mucinex DM for cough and chest congestion; take ibuprofen or acetaminophen for any fever, headache, body aches, or other pain; gargle with warm salt water gargles or lozenges for throat comfort; and follow up with primary care provider if there is no improvement or a worsening of symptoms.     Reassessed patient at this time. Discussed with patient all pertinent ED information and results. Discussed patient diagnosis and plan of treatment. Gave patient all follow up instructions and return to the ED instructions. All questions and concerns were addressed at this time. Pt expresses understanding of information and instructions, and is comfortable with plan to discharge. Pt is stable for discharge. I discussed with patient and/or family/caretaker that evaluation in the ED does not suggest any emergent or life threatening medical conditions requiring immediate intervention beyond what was provided in the ED, and I believe patient is safe for discharge. Regardless, an unremarkable evaluation in the ED does not preclude the development or presence of a serious of life threatening condition. As such, patient was instructed to return immediately for any worsening or change in current symptoms.                                        Clinical Impression:  Final diagnoses:  [J10.1] Influenza A (Primary)          ED Disposition Condition    Discharge Stable          ED Prescriptions       Medication Sig Dispense Start Date End Date Auth. Provider    oseltamivir (TAMIFLU) 75 MG capsule Take 1 capsule (75 mg total) by mouth 2 (two) times  daily. for 5 days 10 capsule 1/26/2025 1/31/2025 Diana Jack PA-C    promethazine-dextromethorphan (PROMETHAZINE-DM) 6.25-15 mg/5 mL Syrp Take 5 mLs by mouth every 4 (four) hours as needed (cough). 150 mL 1/26/2025 2/5/2025 Diana Jack PA-C    methylPREDNISolone (MEDROL DOSEPACK) 4 mg tablet use as directed 21 each 1/26/2025 2/16/2025 Diana Jack PA-C          Follow-up Information       Follow up With Specialties Details Why Contact Info    [facility] - Emergency Dept. Emergency Medicine  If symptoms worsen [id number] [facility] [id number] [phone]             Diana Jack PA-C  01/26/25 6299

## 2025-02-13 ENCOUNTER — OFFICE VISIT (OUTPATIENT)
Dept: ALLERGY | Facility: CLINIC | Age: 30
End: 2025-02-13
Payer: COMMERCIAL

## 2025-02-13 DIAGNOSIS — J45.50 SEVERE PERSISTENT ASTHMA WITHOUT COMPLICATION: Primary | ICD-10-CM

## 2025-02-13 DIAGNOSIS — J82.83 EOSINOPHILIC ASTHMA: ICD-10-CM

## 2025-02-13 DIAGNOSIS — J45.50 STEROID-DEPENDENT ASTHMA, SEVERE PERSISTENT, UNCOMPLICATED: ICD-10-CM

## 2025-02-14 NOTE — PROGRESS NOTES
The patient location is: [facility]   The chief complaint leading to consultation is: Follow-Up Appt - Asthma check in     Visit type: audiovisual    Face to Face time with patient: 10  30 minutes of total time spent on the encounter, which includes face to face time and non-face to face time preparing to see the patient (eg, review of tests), Obtaining and/or reviewing separately obtained history, Documenting clinical information in the electronic or other health record, Independently interpreting results (not separately reported) and communicating results to the patient/family/caregiver, or Care coordination (not separately reported).     Each patient to whom he or she provides medical services by telemedicine is:  (1) informed of the relationship between the physician and patient and the respective role of any other health care provider with respect to management of the patient; and (2) notified that he or she may decline to receive medical services by telemedicine and may withdraw from such care at any time.    Allergy and Immunology  Established Patient Clinic Note    Date: 2/14/2025  Chief Complaint   Patient presents with    Follow-up     History  Georgina Interiano is a 29 y.o. female being seen for follow-up today.    Chronic Nonallergic Rhinitis   - Interval improvement since prior appointment      Severe Persistent Eosinophilic Asthma  Steroid-Dependent Asthma   - Not fully controlled at this time   - High-risk patients     Prior HPI   - Some improvement since Trelegy but continued issues   - Reorder Dupixent at next appointment if continued issues   - ED precautions discussed at length       Initial HPI:    - Onset: Childhood with interval worsening in adulthood   - Symptoms: SOB, chest tightness, wheezing, coughing, resp distress  - Medications: Trelegy, prior Symbicort/Spiriva, albuterol PRN               - Patient reported using albuterol inhaler and nebulizer multiple times per day  constantly   - PO Steroids: Yes - multiple rounds each year - at least 4 rounds in 2024   - Hospitalization/Intubation: Yes - no hx of intubation               - Multiple admission/ED visits for acute hypoxic resp failure   - Suspected triggers include: environmental, immune, infectious  - Smoking: No   - ASA/NSAID use: No   - Hx of CRSwNP:  No   - Control/Albuterol Use: Not controlled, high risk patient               - Uses albuterol 3-4 times daily consistently  - Nocturnal symptoms: Yes - frequently awakens       Allergies, PMH, PSH, Social, and Family History were reviewed.    Current Outpatient Medications on File Prior to Visit   Medication Sig Dispense Refill    albuterol (PROVENTIL/VENTOLIN HFA) 90 mcg/actuation inhaler Inhale 1-2 puffs into the lungs every 6 (six) hours as needed for Wheezing. Rescue 18 g 3    albuterol-budesonide (AIRSUPRA) 90-80 mcg/actuation Inhale 2 puffs into the lungs every 4 (four) hours as needed (Shortness of Breath and/or wheezing). 10.7 g 11    fluticasone-umeclidin-vilanter (TRELEGY ELLIPTA) 200-62.5-25 mcg inhaler Inhale 1 puff into the lungs once daily. 60 each 11    methylPREDNISolone (MEDROL DOSEPACK) 4 mg tablet use as directed 21 each 0    montelukast (SINGULAIR) 10 mg tablet Take 1 tablet (10 mg total) by mouth every evening. 90 tablet 3     No current facility-administered medications on file prior to visit.     Physical Examination  There were no vitals filed for this visit.  GENERAL:  female in no apparent distress and well developed and well nourished  HEAD:  Normocephalic, without obvious abnormality, atraumatic  EYES: sclera anicteric, conjunctiva normochromic  LUNGS: no tachypnea, retractions or cyanosis.  HEART: not examined.  ABDOMEN: not examined.  MUSCULOSKELETAL: no gross joint deformity or swelling.  NEURO: alert, oriented, normal speech, no focal findings or movement disorder noted.  SKIN: normal coloration and turgor, no rashes, no suspicious skin lesions  noted.     Assessment/Plan:   Problem List Items Addressed This Visit       Severe persistent asthma without complication - Primary    Overview     - High risk patient  - Patient with multiple ED visits per year   - Patient with multiple rounds of oral and IV/IM steroids per year      - 12/05/2024: Serum IgE to Zone 6 Aeroallergens negative; Serum IgE elevated at 458  - 11/26/2024: ED visit for Asthma Exacerbation- Acute Respiratory Failure   - 05/17/2024: ED visit for Asthma Exacerbation  - 01/20/2024: ED visit for Asthma Exacerbation         Relevant Medications    dupilumab 300 mg/2 mL PnIj    Eosinophilic asthma    Relevant Medications    dupilumab 300 mg/2 mL PnIj    Steroid-dependent asthma, severe persistent, uncomplicated    Relevant Medications    dupilumab 300 mg/2 mL PnIj     - Severe persistent Asthma - eosinophilia in the past but not as high recently due to steroids   - Improved but not controlled at this time   - Adding Dupixent due to uncontrolled, eosinophilic asthma with steroid dependence   - Inhaler education and ED precautions      Follow up:  Follow up in about 3 months (around 5/13/2025).    Kesler Bourgoyne, MD [facility]  Allergy and Immunology

## 2025-02-17 ENCOUNTER — TELEPHONE (OUTPATIENT)
Dept: PULMONOLOGY | Facility: CLINIC | Age: 30
End: 2025-02-17
Payer: COMMERCIAL

## 2025-02-17 NOTE — TELEPHONE ENCOUNTER
Chronic Disease Management:  Called patient to schedule initial Pulmonary Disease Management appointment.     Left message.

## 2025-02-28 ENCOUNTER — CLINICAL SUPPORT (OUTPATIENT)
Dept: PULMONOLOGY | Facility: CLINIC | Age: 30
End: 2025-02-28
Payer: COMMERCIAL

## 2025-02-28 ENCOUNTER — HOSPITAL ENCOUNTER (EMERGENCY)
Facility: HOSPITAL | Age: 30
Discharge: HOME | End: 2025-03-01
Attending: EMERGENCY MEDICINE
Payer: COMMERCIAL

## 2025-02-28 ENCOUNTER — OFFICE VISIT (OUTPATIENT)
Dept: PULMONOLOGY | Facility: CLINIC | Age: 30
End: 2025-02-28
Payer: COMMERCIAL

## 2025-02-28 VITALS
WEIGHT: 132.69 LBS | RESPIRATION RATE: 15 BRPM | SYSTOLIC BLOOD PRESSURE: 102 MMHG | HEIGHT: 65 IN | BODY MASS INDEX: 22.11 KG/M2 | BODY MASS INDEX: 22.11 KG/M2 | HEIGHT: 65 IN | OXYGEN SATURATION: 90 % | WEIGHT: 132.69 LBS | HEART RATE: 90 BPM | DIASTOLIC BLOOD PRESSURE: 78 MMHG

## 2025-02-28 DIAGNOSIS — J40 BRONCHITIS WITH ACUTE WHEEZING: Primary | ICD-10-CM

## 2025-02-28 DIAGNOSIS — J45.901 ASTHMA EXACERBATION: ICD-10-CM

## 2025-02-28 DIAGNOSIS — J45.50 STEROID-DEPENDENT ASTHMA, SEVERE PERSISTENT, UNCOMPLICATED: ICD-10-CM

## 2025-02-28 DIAGNOSIS — J96.01 ACUTE HYPOXEMIC RESPIRATORY FAILURE: Primary | ICD-10-CM

## 2025-02-28 DIAGNOSIS — J45.51 SEVERE PERSISTENT ASTHMA WITH EXACERBATION: ICD-10-CM

## 2025-02-28 DIAGNOSIS — J82.83 EOSINOPHILIC ASTHMA: ICD-10-CM

## 2025-02-28 DIAGNOSIS — J45.909 NEAR FATAL ASTHMA: ICD-10-CM

## 2025-02-28 DIAGNOSIS — J45.50 SEVERE PERSISTENT ASTHMA WITHOUT COMPLICATION: ICD-10-CM

## 2025-02-28 DIAGNOSIS — R06.02 SOB (SHORTNESS OF BREATH): ICD-10-CM

## 2025-02-28 LAB
ALBUMIN SERPL BCP-MCNC: 3.8 G/DL (ref 3.5–5.2)
ALLENS TEST: ABNORMAL
ALP SERPL-CCNC: 67 U/L (ref 40–150)
ALT SERPL W/O P-5'-P-CCNC: 11 U/L (ref 10–44)
ANION GAP SERPL CALC-SCNC: 10 MMOL/L (ref 8–16)
AST SERPL-CCNC: 13 U/L (ref 10–40)
B-HCG UR QL: NEGATIVE
BASOPHILS # BLD AUTO: 0.02 K/UL (ref 0–0.2)
BASOPHILS NFR BLD: 0.2 % (ref 0–1.9)
BILIRUB SERPL-MCNC: 0.6 MG/DL (ref 0.1–1)
BILIRUB UR QL STRIP: NEGATIVE
BUN SERPL-MCNC: 8 MG/DL (ref 6–20)
CALCIUM SERPL-MCNC: 9.5 MG/DL (ref 8.7–10.5)
CHLORIDE SERPL-SCNC: 107 MMOL/L (ref 95–110)
CLARITY UR: CLEAR
CO2 SERPL-SCNC: 23 MMOL/L (ref 23–29)
COLOR UR: YELLOW
CREAT SERPL-MCNC: 0.8 MG/DL (ref 0.5–1.4)
D DIMER PPP IA.FEU-MCNC: 0.8 MG/L FEU
DELSYS: ABNORMAL
DIFFERENTIAL METHOD BLD: ABNORMAL
EOSINOPHIL # BLD AUTO: 0.5 K/UL (ref 0–0.5)
EOSINOPHIL NFR BLD: 4.7 % (ref 0–8)
ERYTHROCYTE [DISTWIDTH] IN BLOOD BY AUTOMATED COUNT: 13.4 % (ref 11.5–14.5)
EST. GFR  (NO RACE VARIABLE): >60 ML/MIN/1.73 M^2
FIO2: 21
GLUCOSE SERPL-MCNC: 150 MG/DL (ref 70–110)
GLUCOSE UR QL STRIP: NEGATIVE
HCO3 UR-SCNC: 20.9 MMOL/L (ref 24–28)
HCT VFR BLD AUTO: 41 % (ref 37–48.5)
HGB BLD-MCNC: 14 G/DL (ref 12–16)
HGB UR QL STRIP: ABNORMAL
IMM GRANULOCYTES # BLD AUTO: 0.05 K/UL (ref 0–0.04)
IMM GRANULOCYTES NFR BLD AUTO: 0.5 % (ref 0–0.5)
INFLUENZA A, MOLECULAR: NEGATIVE
INFLUENZA B, MOLECULAR: NEGATIVE
KETONES UR QL STRIP: ABNORMAL
LACTATE SERPL-SCNC: 1.7 MMOL/L (ref 0.5–2.2)
LEUKOCYTE ESTERASE UR QL STRIP: NEGATIVE
LYMPHOCYTES # BLD AUTO: 2.1 K/UL (ref 1–4.8)
LYMPHOCYTES NFR BLD: 19.1 % (ref 18–48)
MCH RBC QN AUTO: 32 PG (ref 27–31)
MCHC RBC AUTO-ENTMCNC: 34.1 G/DL (ref 32–36)
MCV RBC AUTO: 94 FL (ref 82–98)
MICROSCOPIC COMMENT: NORMAL
MODE: ABNORMAL
MONOCYTES # BLD AUTO: 1.1 K/UL (ref 0.3–1)
MONOCYTES NFR BLD: 9.5 % (ref 4–15)
NEUTROPHILS # BLD AUTO: 7.3 K/UL (ref 1.8–7.7)
NEUTROPHILS NFR BLD: 66 % (ref 38–73)
NITRITE UR QL STRIP: NEGATIVE
NRBC BLD-RTO: 0 /100 WBC
PCO2 BLDA: 32.9 MMHG (ref 35–45)
PH SMN: 7.41 [PH] (ref 7.35–7.45)
PH UR STRIP: 7 [PH] (ref 5–8)
PLATELET # BLD AUTO: 417 K/UL (ref 150–450)
PMV BLD AUTO: 9.9 FL (ref 9.2–12.9)
PO2 BLDA: 55 MMHG (ref 80–100)
POC BE: -4 MMOL/L
POC SATURATED O2: 89 % (ref 95–100)
POTASSIUM SERPL-SCNC: 3.1 MMOL/L (ref 3.5–5.1)
PROT SERPL-MCNC: 7.1 G/DL (ref 6–8.4)
PROT UR QL STRIP: NEGATIVE
RBC # BLD AUTO: 4.37 M/UL (ref 4–5.4)
RBC #/AREA URNS HPF: 1 /HPF (ref 0–4)
SAMPLE: ABNORMAL
SARS-COV-2 RDRP RESP QL NAA+PROBE: NEGATIVE
SITE: ABNORMAL
SODIUM SERPL-SCNC: 140 MMOL/L (ref 136–145)
SP GR UR STRIP: 1.01 (ref 1–1.03)
SPECIMEN SOURCE: NORMAL
SQUAMOUS #/AREA URNS HPF: 2 /HPF
URN SPEC COLLECT METH UR: ABNORMAL
UROBILINOGEN UR STRIP-ACNC: NEGATIVE EU/DL
WBC # BLD AUTO: 11.09 K/UL (ref 3.9–12.7)
WBC #/AREA URNS HPF: 4 /HPF (ref 0–5)

## 2025-02-28 PROCEDURE — 25500020 PHARM REV CODE 255: Performed by: EMERGENCY MEDICINE

## 2025-02-28 PROCEDURE — 85025 COMPLETE CBC W/AUTO DIFF WBC: CPT | Performed by: EMERGENCY MEDICINE

## 2025-02-28 PROCEDURE — 99285 EMERGENCY DEPT VISIT HI MDM: CPT | Mod: 25

## 2025-02-28 PROCEDURE — 99999 PR PBB SHADOW E&M-EST. PATIENT-LVL I: CPT | Mod: PBBFAC,,,

## 2025-02-28 PROCEDURE — 94640 AIRWAY INHALATION TREATMENT: CPT

## 2025-02-28 PROCEDURE — 87502 INFLUENZA DNA AMP PROBE: CPT | Performed by: EMERGENCY MEDICINE

## 2025-02-28 PROCEDURE — 87635 SARS-COV-2 COVID-19 AMP PRB: CPT | Performed by: EMERGENCY MEDICINE

## 2025-02-28 PROCEDURE — 83605 ASSAY OF LACTIC ACID: CPT | Performed by: EMERGENCY MEDICINE

## 2025-02-28 PROCEDURE — 81000 URINALYSIS NONAUTO W/SCOPE: CPT | Performed by: EMERGENCY MEDICINE

## 2025-02-28 PROCEDURE — 81025 URINE PREGNANCY TEST: CPT | Performed by: EMERGENCY MEDICINE

## 2025-02-28 PROCEDURE — 25000242 PHARM REV CODE 250 ALT 637 W/ HCPCS: Performed by: EMERGENCY MEDICINE

## 2025-02-28 PROCEDURE — 80053 COMPREHEN METABOLIC PANEL: CPT | Performed by: EMERGENCY MEDICINE

## 2025-02-28 PROCEDURE — 99999 PR PBB SHADOW E&M-EST. PATIENT-LVL III: CPT | Mod: PBBFAC,,, | Performed by: INTERNAL MEDICINE

## 2025-02-28 PROCEDURE — 85379 FIBRIN DEGRADATION QUANT: CPT | Performed by: EMERGENCY MEDICINE

## 2025-02-28 PROCEDURE — 99999 PR PBB SHADOW E&M-EST. PATIENT-LVL II: CPT | Mod: PBBFAC,,,

## 2025-02-28 RX ORDER — FLUTICASONE FUROATE, UMECLIDINIUM BROMIDE AND VILANTEROL TRIFENATATE 200; 62.5; 25 UG/1; UG/1; UG/1
1 POWDER RESPIRATORY (INHALATION) DAILY
Qty: 60 EACH | Refills: 11 | Status: SHIPPED | OUTPATIENT
Start: 2025-02-28 | End: 2025-02-28 | Stop reason: SDUPTHER

## 2025-02-28 RX ORDER — TRIAMCINOLONE ACETONIDE 40 MG/ML
40 INJECTION, SUSPENSION INTRA-ARTICULAR; INTRAMUSCULAR
Status: COMPLETED | OUTPATIENT
Start: 2025-02-28 | End: 2025-02-28

## 2025-02-28 RX ORDER — FLUTICASONE FUROATE, UMECLIDINIUM BROMIDE AND VILANTEROL TRIFENATATE 200; 62.5; 25 UG/1; UG/1; UG/1
1 POWDER RESPIRATORY (INHALATION) DAILY
Qty: 60 EACH | Refills: 11 | Status: SHIPPED | OUTPATIENT
Start: 2025-02-28 | End: 2026-02-28

## 2025-02-28 RX ORDER — ALBUTEROL SULFATE 0.83 MG/ML
2.5 SOLUTION RESPIRATORY (INHALATION)
Status: COMPLETED | OUTPATIENT
Start: 2025-02-28 | End: 2025-02-28

## 2025-02-28 RX ORDER — TRIAMCINOLONE ACETONIDE 1 MG/G
CREAM TOPICAL 2 TIMES DAILY
COMMUNITY
Start: 2025-02-27 | End: 2025-03-06

## 2025-02-28 RX ORDER — IPRATROPIUM BROMIDE AND ALBUTEROL SULFATE 2.5; .5 MG/3ML; MG/3ML
3 SOLUTION RESPIRATORY (INHALATION) ONCE
Qty: 3 ML | Refills: 0 | Status: SHIPPED | OUTPATIENT
Start: 2025-02-28 | End: 2025-02-28

## 2025-02-28 RX ADMIN — ALBUTEROL SULFATE 2.5 MG: 2.5 SOLUTION RESPIRATORY (INHALATION) at 07:02

## 2025-02-28 RX ADMIN — ALBUTEROL SULFATE 2.5 MG: 0.83 SOLUTION RESPIRATORY (INHALATION) at 07:02

## 2025-02-28 RX ADMIN — IOHEXOL 100 ML: 350 INJECTION, SOLUTION INTRAVENOUS at 10:02

## 2025-02-28 RX ADMIN — TRIAMCINOLONE ACETONIDE 40 MG: 40 INJECTION, SUSPENSION INTRA-ARTICULAR; INTRAMUSCULAR at 03:02

## 2025-02-28 NOTE — PROCEDURES
ABG completed. See ABG Below.    Component      Latest Ref Rng 2/28/2025   POC PH      7.35 - 7.45  7.412    POC PCO2      35 - 45 mmHg 32.9 (L)    POC PO2      80 - 100 mmHg 55 (LL)    POC HCO3      24 - 28 mmol/L 20.9 (L)    POC BE      -2 to 2 mmol/L -4 (L)    POC SATURATED O2      95 - 100 % 89    Sample ARTERIAL    Site RR    Allens Test Pass    DelSys Room Air    Mode SPONT    FiO2 21       Legend:  (L) Low  (LL) Low Panic      Respiratory alkalosis and severe hypoxia noted.    Interpretation:  [] Arterial blood gases on room air demonstrate normal pCO2 and pO2  [] Arterial blood gases on room air are abnormal demonstrating hypercarbia (pCO2 >45 mmHg)  [] Arterial blood gases on room air are abnormal demonstrating hypocarbia (pCO2 < 35 mmHg)  [x] Arterial blood gases on room air are abnormal demonstrating hypoxemia (pO2 < 80 mmHg)  [] Arterial blood gases on room air are abnormal demonstrating hyperoxemia (pO2 >120 mmHg)  [] Arterial blood gases on room air are abnormal demonstrating hypoxemia (pO2 < 80 mmHg) and hypercarbia (pCO2>45 mmHg)    The table below summarizes the main interpretations of the relationship between the arterial blood gases, pH, pCO2 and HCO-3    []    I

## 2025-02-28 NOTE — Clinical Note
"Georgina Rodriguezia" Jaydon was seen and treated in our emergency department on 2/28/2025.  She may return to work on 03/04/2025.       If you have any questions or concerns, please don't hesitate to call.      Nelson MORALEZT-P     "

## 2025-02-28 NOTE — PROGRESS NOTES
"                                            Initial Outpatient Pulmonary Evaluation       SUBJECTIVE:     Chief Complaint   Patient presents with    Asthma    Breathing Problem       History of Present Illness:    Patient is a 29 y.o. female with history of near fatal asthma hospital presentation with need of NIV ABG from 2023 reviewed at [facility] with a CO2 of 52 currently on air supra and Trelegy Ellipta and montelukast      Evaluated by Allergy immunology elevated IgE awaiting to start Dupixent.      O2 sat at home 86% has severe shortness of breath, presented to the urgent care received nebulizer.      Complains of congestion coughing but no fever or chills.    Review of Systems   Constitutional:  Positive for fatigue.   Respiratory:  Positive for apnea, cough, sputum production, wheezing and dyspnea on extertion.        Review of patient's allergies indicates:  No Known Allergies    Current Medications[1]    Past Medical History:   Diagnosis Date    Asthma      History reviewed. No pertinent surgical history.  No family history on file.  Social History[2]       OBJECTIVE:     Vital Signs (Most Recent)  Vital Signs  Pulse: 90  Resp: 15  SpO2: (!) 90 %  BP: 102/78  Patient Position: Sitting  Pain Score: 0-No pain  Height and Weight  Height: 5' 5" (165.1 cm)  Weight: 60.2 kg (132 lb 11.5 oz)  BSA (Calculated - sq m): 1.66 sq meters  BMI (Calculated): 22.1  Weight in (lb) to have BMI = 25: 149.9]  Wt Readings from Last 2 Encounters:   02/28/25 60.2 kg (132 lb 11.5 oz)   02/28/25 60.2 kg (132 lb 11.5 oz)         Physical Exam:  Physical Exam   Constitutional: She appears well-developed and well-nourished.   Pulmonary/Chest: She is in respiratory distress. She has decreased breath sounds. She has no wheezes. She has no rhonchi.   Appears with moderate respiratory distress and increased work of breathing.       Laboratory  Lab Results   Component Value Date    WBC 9.17 01/26/2025    RBC 4.31 01/26/2025    " "HGB 13.5 01/26/2025    HCT 41.4 01/26/2025    MCV 96 01/26/2025    MCH 31.3 (H) 01/26/2025    MCHC 32.6 01/26/2025    RDW 12.6 01/26/2025     01/26/2025    MPV 10.0 01/26/2025    GRAN 6.4 01/26/2025    GRAN 70.3 01/26/2025    LYMPH 1.7 01/26/2025    LYMPH 18.6 01/26/2025    MONO 0.9 01/26/2025    MONO 9.9 01/26/2025    EOS 0.1 01/26/2025    BASO 0.03 01/26/2025    EOSINOPHIL 0.7 01/26/2025    BASOPHIL 0.3 01/26/2025       BMP  Lab Results   Component Value Date     01/26/2025    K 3.2 (L) 01/26/2025     01/26/2025    CO2 22 (L) 01/26/2025    BUN 10 01/26/2025    CREATININE 1.0 01/26/2025    CALCIUM 8.5 (L) 01/26/2025    ANIONGAP 13 01/26/2025    ESTGFRAFRICA 117 04/01/2023    AST 14 11/26/2024    ALT 12 11/26/2024    PROT 7.5 11/26/2024       Lab Results   Component Value Date    BNP 91 03/01/2022       Lab Results   Component Value Date    TSH 0.371 03/01/2022       No results found for: "SEDRATE"    No results found for: "CRP"    Lab Results   Component Value Date    .0 (H) 12/05/2024       Lab Results   Component Value Date    ASPERGILLUS <0.10 12/05/2024     Lab Results   Component Value Date    AFUMIGATUSCL CLASS 0 12/05/2024       Latest Reference Range & Units 02/28/25 15:28   POC PH 7.35 - 7.45  7.412   POC PCO2 35 - 45 mmHg 32.9 (L)   POC PO2 80 - 100 mmHg 55 (LL)   POC HCO3 24 - 28 mmol/L 20.9 (L)   POC SATURATED O2 95 - 100 % 89   Sample  ARTERIAL   POC BE -2 to 2 mmol/L -4 (L)   FiO2  21   DelSys  Room Air   Site  RR   Mode  SPONT   (LL): Data is critically low  (L): Data is abnormally low    No results found for: "ACE"    Diagnostic Results:  I have personally reviewed today the following studies :            ASSESSMENT/PLAN:     Acute hypoxemic respiratory failure    Severe persistent asthma with exacerbation  -     albuterol-ipratropium (DUO-NEB) 2.5 mg-0.5 mg/3 mL nebulizer solution; Take 3 mLs by nebulization once. Rescue for 1 dose  Dispense: 3 mL; Refill: 0  -     " triamcinolone acetonide injection 40 mg  -     PFT - related Arterial Blood Gas; Future    Near fatal asthma    Severe persistent asthma without complication  -     Discontinue: fluticasone-umeclidin-vilanter (TRELEGY ELLIPTA) 200-62.5-25 mcg inhaler; Inhale 1 puff into the lungs once daily.  Dispense: 60 each; Refill: 11  -     fluticasone-umeclidin-vilanter (TRELEGY ELLIPTA) 200-62.5-25 mcg inhaler; Inhale 1 puff into the lungs once daily.  Dispense: 60 each; Refill: 11    Eosinophilic asthma  -     Discontinue: fluticasone-umeclidin-vilanter (TRELEGY ELLIPTA) 200-62.5-25 mcg inhaler; Inhale 1 puff into the lungs once daily.  Dispense: 60 each; Refill: 11  -     fluticasone-umeclidin-vilanter (TRELEGY ELLIPTA) 200-62.5-25 mcg inhaler; Inhale 1 puff into the lungs once daily.  Dispense: 60 each; Refill: 11    Steroid-dependent asthma, severe persistent, uncomplicated  -     Discontinue: fluticasone-umeclidin-vilanter (TRELEGY ELLIPTA) 200-62.5-25 mcg inhaler; Inhale 1 puff into the lungs once daily.  Dispense: 60 each; Refill: 11  -     fluticasone-umeclidin-vilanter (TRELEGY ELLIPTA) 200-62.5-25 mcg inhaler; Inhale 1 puff into the lungs once daily.  Dispense: 60 each; Refill: 11      Albuterol Atrovent x1 now.      IM Kenalog 40 mg x 1 now.      Needs admission for intravenous steroids oxygen monitoring p.r.n. NIV    No hypercapnia noted ABG paradoxically to an asthma exacerbation but patient seemed hyperventilating and tachypneic.      Will transfer to ER by EMS.      Eventually we need to follow-up on biological treatment for asthma.   Follow up in about 2 weeks (around 3/14/2025).    This note was prepared using voice recognition system and is likely to have sound alike errors that may have been overlooked even after proof reading.  Please call me with any questions    Discussed diagnosis, its evaluation, treatment and usual course. All questions answered.    Thank you for the courtesy of participating in the  care of this patient    Brisa Del Valle MD               [1]   Current Outpatient Medications   Medication Sig Dispense Refill    albuterol (PROVENTIL/VENTOLIN HFA) 90 mcg/actuation inhaler Inhale 1-2 puffs into the lungs every 6 (six) hours as needed for Wheezing. Rescue 18 g 3    albuterol-budesonide (AIRSUPRA) 90-80 mcg/actuation Inhale 2 puffs into the lungs every 4 (four) hours as needed (Shortness of Breath and/or wheezing). 10.7 g 11    dupilumab 300 mg/2 mL PnIj Inject 2 mLs (300 mg total) into the skin every 14 (fourteen) days. 4 mL 11    montelukast (SINGULAIR) 10 mg tablet Take 1 tablet (10 mg total) by mouth every evening. 90 tablet 3    albuterol-ipratropium (DUO-NEB) 2.5 mg-0.5 mg/3 mL nebulizer solution Take 3 mLs by nebulization once. Rescue for 1 dose 3 mL 0    fluticasone-umeclidin-vilanter (TRELEGY ELLIPTA) 200-62.5-25 mcg inhaler Inhale 1 puff into the lungs once daily. 60 each 11     No current facility-administered medications for this visit.   [2]   Social History  Tobacco Use    Smoking status: Never    Smokeless tobacco: Never   Substance Use Topics    Alcohol use: No    Drug use: No

## 2025-02-28 NOTE — PROGRESS NOTES
Patient to reschedule pulmonary disease management appointment. Scheduled patient to see pulmonary provider.

## 2025-03-01 VITALS
OXYGEN SATURATION: 94 % | TEMPERATURE: 99 F | HEART RATE: 88 BPM | RESPIRATION RATE: 18 BRPM | SYSTOLIC BLOOD PRESSURE: 121 MMHG | DIASTOLIC BLOOD PRESSURE: 70 MMHG

## 2025-03-01 RX ORDER — PREDNISONE 20 MG/1
40 TABLET ORAL DAILY
Qty: 10 TABLET | Refills: 0 | Status: SHIPPED | OUTPATIENT
Start: 2025-03-01 | End: 2025-03-06

## 2025-03-01 NOTE — ED PROVIDER NOTES
SCRIBE #1 NOTE: IReji, am scribing for, and in the presence of, Nic Hickman Jr., MD. I have scribed the HPI, ROS, and PE.     SCRIBE #2 NOTE: I, Bharti Valdez, am scribing for, and in the presence of,  Lexx Siddiqi MD. I have scribed the remaining portions of the note not scribed by Scribe #1.      History     Chief Complaint   Patient presents with    Shortness of Breath     Asthma exacerbation today at the pulmonary clinic. Given KENALOG by the pulm clinic for some relief      Review of patient's allergies indicates:  No Known Allergies      History of Present Illness     HPI    2/28/2025, 6:38 PM  History obtained from the patient      History of Present Illness: Georgina Interiano is a 29 y.o. female patient with a PMHx of asthma who presents to the Emergency Department for evaluation of SOB which onset gradually within the past week. Pt was seen at pulmonary clinic today. Pt was given Kenalog with minimal relief. Pt sent to ED for further evaluation due to asthma exacerbation. Pt takes albuterol and Trelegy PRN with minimal improvement. Pt has been giving herself 2-3 breathing treatments daily since going back to work. Symptoms are constant and moderate in severity. No mitigating or exacerbating factors reported. Patient denies any fever, cough, CP, dizziness, and all other sxs at this time. No further complaints or concerns at this time.       Arrival mode: Ambulance Service    PCP: SALAZAR Ferrell Jr., FNP        Past Medical History:  Past Medical History:   Diagnosis Date    Asthma        Past Surgical History:  History reviewed. No pertinent surgical history.      Family History:  No family history on file.    Social History:  Social History     Tobacco Use    Smoking status: Never    Smokeless tobacco: Never   Substance and Sexual Activity    Alcohol use: No    Drug use: No    Sexual activity: Not Currently        Review of Systems     Review of Systems   Constitutional:  Negative for  chills and fever.   HENT:  Negative for congestion and sore throat.    Respiratory:  Positive for shortness of breath. Negative for cough.    Cardiovascular:  Negative for chest pain.   Gastrointestinal:  Negative for abdominal pain, nausea and vomiting.   Genitourinary:  Negative for dysuria.   Musculoskeletal:  Negative for back pain.   Skin:  Negative for rash.   Neurological:  Negative for dizziness, weakness, light-headedness, numbness and headaches.   Hematological:  Does not bruise/bleed easily.   All other systems reviewed and are negative.       Physical Exam     Initial Vitals [02/28/25 1648]   BP Pulse Resp Temp SpO2   134/87 76 18 97 °F (36.1 °C) 97 %      MAP       --          Physical Exam  Nursing Notes and Vital Signs Reviewed.  Constitutional: Patient is in no acute distress. Thin appearing.  Head: Atraumatic. Normocephalic.  Eyes: PERRL. EOM intact. Conjunctivae are not pale. No scleral icterus.  ENT: Mucous membranes are moist. Oropharynx is clear and symmetric.    Neck: Supple. Full ROM. No lymphadenopathy.  Cardiovascular: Regular rate. Regular rhythm. No murmurs, rubs, or gallops. Distal pulses are 2+ and symmetric.  Pulmonary/Chest: No respiratory distress. Diminished breath sounds with some wheezing.  Abdominal: Soft and non-distended.  There is no tenderness.  No rebound, guarding, or rigidity. Good bowel sounds.  Genitourinary: No CVA tenderness  Musculoskeletal: Moves all extremities. No obvious deformities. No edema. No calf tenderness.  Skin: Warm and dry.  Neurological:  Alert, awake, and appropriate.  Normal speech.  No acute focal neurological deficits are appreciated.  Psychiatric: Normal affect. Good eye contact. Appropriate in content.     ED Course   Procedures  ED Vital Signs:  Vitals:    02/28/25 2058 02/28/25 2128 02/28/25 2138 02/28/25 2148   BP: 119/63 (!) 118/59     Pulse: 108 88 95 108   Resp: (!) 49 18     Temp:       TempSrc:       SpO2:  97% 98% 95%    02/28/25 2158  02/28/25 2333 02/28/25 2358 03/01/25 0028   BP: 116/60 122/85 103/66 108/66   Pulse: (!) 111 88 82 77   Resp:       Temp:       TempSrc:       SpO2: (!) 94% (!) 93% 95%     03/01/25 0038 03/01/25 0058 03/01/25 0118 03/01/25 0128   BP:  109/71  119/68   Pulse:  74  77   Resp:       Temp:       TempSrc:       SpO2: (!) 94% (!) 93% (!) 94%     03/01/25 0203 03/01/25 0228 03/01/25 0233   BP: 121/70     Pulse: 88     Resp:      Temp:   98.8 °F (37.1 °C)   TempSrc:   Oral   SpO2:  (!) 94%        Abnormal Lab Results:  Labs Reviewed   CBC W/ AUTO DIFFERENTIAL - Abnormal       Result Value    WBC 11.09      RBC 4.37      Hemoglobin 14.0      Hematocrit 41.0      MCV 94      MCH 32.0 (*)     MCHC 34.1      RDW 13.4      Platelets 417      MPV 9.9      Immature Granulocytes 0.5      Gran # (ANC) 7.3      Immature Grans (Abs) 0.05 (*)     Lymph # 2.1      Mono # 1.1 (*)     Eos # 0.5      Baso # 0.02      nRBC 0      Gran % 66.0      Lymph % 19.1      Mono % 9.5      Eosinophil % 4.7      Basophil % 0.2      Differential Method Automated     COMPREHENSIVE METABOLIC PANEL - Abnormal    Sodium 140      Potassium 3.1 (*)     Chloride 107      CO2 23      Glucose 150 (*)     BUN 8      Creatinine 0.8      Calcium 9.5      Total Protein 7.1      Albumin 3.8      Total Bilirubin 0.6      Alkaline Phosphatase 67      AST 13      ALT 11      eGFR >60      Anion Gap 10     URINALYSIS, REFLEX TO URINE CULTURE - Abnormal    Specimen UA Urine, Clean Catch      Color, UA Yellow      Appearance, UA Clear      pH, UA 7.0      Specific Gravity, UA 1.015      Protein, UA Negative      Glucose, UA Negative      Ketones, UA 2+ (*)     Bilirubin (UA) Negative      Occult Blood UA 3+ (*)     Nitrite, UA Negative      Urobilinogen, UA Negative      Leukocytes, UA Negative      Narrative:     Specimen Source->Urine   D DIMER, QUANTITATIVE - Abnormal    D-Dimer 0.80 (*)    INFLUENZA A & B BY MOLECULAR    Influenza A, Molecular Negative      Influenza  B, Molecular Negative      Flu A & B Source Nasal swab     SARS-COV-2 RNA AMPLIFICATION, QUAL    SARS-CoV-2 RNA, Amplification, Qual Negative     LACTIC ACID, PLASMA    Lactate (Lactic Acid) 1.7     PREGNANCY TEST, URINE RAPID    Preg Test, Ur Negative      Narrative:     Specimen Source->Urine   URINALYSIS MICROSCOPIC    RBC, UA 1      WBC, UA 4      Squam Epithel, UA 2      Microscopic Comment SEE COMMENT      Narrative:     Specimen Source->Urine        All Lab Results:  Results for orders placed or performed during the hospital encounter of 02/28/25   Influenza A & B by Molecular    Collection Time: 02/28/25  6:43 PM    Specimen: Nasopharyngeal Swab   Result Value Ref Range    Influenza A, Molecular Negative Negative    Influenza B, Molecular Negative Negative    Flu A & B Source Nasal swab    COVID-19 Rapid Screening    Collection Time: 02/28/25  6:44 PM   Result Value Ref Range    SARS-CoV-2 RNA, Amplification, Qual Negative Negative   CBC auto differential    Collection Time: 02/28/25  9:08 PM   Result Value Ref Range    WBC 11.09 3.90 - 12.70 K/uL    RBC 4.37 4.00 - 5.40 M/uL    Hemoglobin 14.0 12.0 - 16.0 g/dL    Hematocrit 41.0 37.0 - 48.5 %    MCV 94 82 - 98 fL    MCH 32.0 (H) 27.0 - 31.0 pg    MCHC 34.1 32.0 - 36.0 g/dL    RDW 13.4 11.5 - 14.5 %    Platelets 417 150 - 450 K/uL    MPV 9.9 9.2 - 12.9 fL    Immature Granulocytes 0.5 0.0 - 0.5 %    Gran # (ANC) 7.3 1.8 - 7.7 K/uL    Immature Grans (Abs) 0.05 (H) 0.00 - 0.04 K/uL    Lymph # 2.1 1.0 - 4.8 K/uL    Mono # 1.1 (H) 0.3 - 1.0 K/uL    Eos # 0.5 0.0 - 0.5 K/uL    Baso # 0.02 0.00 - 0.20 K/uL    nRBC 0 0 /100 WBC    Gran % 66.0 38.0 - 73.0 %    Lymph % 19.1 18.0 - 48.0 %    Mono % 9.5 4.0 - 15.0 %    Eosinophil % 4.7 0.0 - 8.0 %    Basophil % 0.2 0.0 - 1.9 %    Differential Method Automated    Comprehensive metabolic panel    Collection Time: 02/28/25  9:08 PM   Result Value Ref Range    Sodium 140 136 - 145 mmol/L    Potassium 3.1 (L) 3.5 - 5.1  mmol/L    Chloride 107 95 - 110 mmol/L    CO2 23 23 - 29 mmol/L    Glucose 150 (H) 70 - 110 mg/dL    BUN 8 6 - 20 mg/dL    Creatinine 0.8 0.5 - 1.4 mg/dL    Calcium 9.5 8.7 - 10.5 mg/dL    Total Protein 7.1 6.0 - 8.4 g/dL    Albumin 3.8 3.5 - 5.2 g/dL    Total Bilirubin 0.6 0.1 - 1.0 mg/dL    Alkaline Phosphatase 67 40 - 150 U/L    AST 13 10 - 40 U/L    ALT 11 10 - 44 U/L    eGFR >60 >60 mL/min/1.73 m^2    Anion Gap 10 8 - 16 mmol/L   Lactic acid, plasma    Collection Time: 02/28/25  9:08 PM   Result Value Ref Range    Lactate (Lactic Acid) 1.7 0.5 - 2.2 mmol/L   D dimer, quantitative    Collection Time: 02/28/25  9:08 PM   Result Value Ref Range    D-Dimer 0.80 (H) <0.50 mg/L FEU   Urinalysis, Reflex to Urine Culture Urine, Clean Catch    Collection Time: 02/28/25  9:10 PM    Specimen: Urine   Result Value Ref Range    Specimen UA Urine, Clean Catch     Color, UA Yellow Yellow, Straw, Zahra    Appearance, UA Clear Clear    pH, UA 7.0 5.0 - 8.0    Specific Gravity, UA 1.015 1.005 - 1.030    Protein, UA Negative Negative    Glucose, UA Negative Negative    Ketones, UA 2+ (A) Negative    Bilirubin (UA) Negative Negative    Occult Blood UA 3+ (A) Negative    Nitrite, UA Negative Negative    Urobilinogen, UA Negative <2.0 EU/dL    Leukocytes, UA Negative Negative   Pregnancy, urine rapid (UPT)    Collection Time: 02/28/25  9:10 PM   Result Value Ref Range    Preg Test, Ur Negative    Urinalysis Microscopic    Collection Time: 02/28/25  9:10 PM   Result Value Ref Range    RBC, UA 1 0 - 4 /hpf    WBC, UA 4 0 - 5 /hpf    Squam Epithel, UA 2 /hpf    Microscopic Comment SEE COMMENT          Imaging Results:  Imaging Results              CTA Chest Non-Coronary (PE Studies) (Final result)  Result time 03/01/25 01:38:24      Final result by Alfa Palma MD (03/01/25 01:38:24)                   Impression:      No pulmonary embolus.    Acute bronchitis.      All CT scans at [this location] are performed using dose modulation  techniques as appropriate to a performed exam including the following: automated exposure control; adjustment of the mA and/or kV according to patient size (this includes techniques or standardized protocols for targeted exams where dose is matched to indication / reason for exam; i.e. extremities or head); use of iterative reconstruction technique.    RADIOLOGIST:  SALAZAR Palma M.D.    Finalized on: 3/1/2025 1:38 AM By:  SALAZAR Palma MD, MD  [facility]# [id number]      2025-03-01 01:41:34.175     [facility]               Narrative:    EXAM: CTA CHEST NON CORONARY (PE STUDIES)    CLINICAL HISTORY:  Shortness of breath and chest pain    COMPARISON: Chest radiograph today    TECHNIQUE: Axial CTA imaging was performed through the chest at the point of peak pulmonary artery opacification with intravenous contrast. Subsequent MIP and three dimensional reconstructed images were created and interpreted.    FINDINGS:  No evidence of pulmonary embolus.  The pulmonary artery caliber is normal.  No alveolar consolidations.  Multifocal mucus plugging throughout both lungs with bronchial wall thickening consistent with bronchitis.  No effusions. The heart and great vessels are within normal size limits.   No mediastinal, hilar, or axillary lymphadenopathy.    Within the upper abdomen, the the visualized organs are within normal limits.    No significant osseous abnormality identified.                                             X-Ray Chest AP Portable (Final result)  Result time 02/28/25 19:13:28      Final result by Getachew SalazarAndre), MD (02/28/25 19:13:28)                   Impression:      Negative single view chest x-ray.      Electronically signed by: Getachew Salazar MD  Date:    02/28/2025  Time:    19:13               Narrative:    EXAMINATION:  XR CHEST AP PORTABLE    CLINICAL HISTORY:  Shortness of breath,    COMPARISON:  Chest, 01/26/2025.    FINDINGS:  Heart size is normal. The lung fields are clear. No acute  cardiopulmonary infiltrate.                                              The Emergency Provider reviewed the vital signs and test results, which are outlined above.     ED Discussion     8:00 PM: Dr. Hickman transfers care of patient to Dr. Siddiqi pending lab and imaging results.    8:20 PM: Dr. Siddiqi re-evaluated pt. Pt's lungs sound clear, but her oxygen saturation is still in the low 90's on room air. Pt denies any recent surgery, travel, or birth control use. Discussed with pt and/or family/caretaker all pertinent results. Discussed with pt and/or family/caretaker any concerns expressed at this time. Answered all questions. Pt and/or family/caretaker express understanding at this time.     01:56 AM: Reassessed pt at this time. Discussed with patient and/or family/caretaker all pertinent ED information and results. Discussed pt dx and plan of tx. Gave the patient all f/u and return to the ED instructions. All questions and concerns were addressed at this time. Patient and/or family/caretaker expresses understanding of information and instructions, and is comfortable with plan to discharge. Pt is stable for discharge.     I discussed with patient and/or family/caretaker that evaluation in the ED does not suggest any emergent or life threatening medical conditions requiring immediate intervention beyond what was provided in the ED, and I believe patient is safe for discharge.  Regardless, an unremarkable evaluation in the ED does not preclude the development or presence of a serious of life threatening condition. As such, I instructed that the patient is to return immediately for any worsening or change in current symptoms.        Medical Decision Making  Amount and/or Complexity of Data Reviewed  Labs: ordered. Decision-making details documented in ED Course.  Radiology: ordered. Decision-making details documented in ED Course.    Risk  Prescription drug management.                ED Medication(s):  Medications    albuterol nebulizer solution 2.5 mg (2.5 mg Nebulization Given 2/28/25 1903)   albuterol nebulizer solution 2.5 mg (2.5 mg Nebulization Given by Other 2/28/25 1930)   iohexoL (OMNIPAQUE 350) injection 100 mL (100 mLs Intravenous Given 2/28/25 2250)       Discharge Medication List as of 3/1/2025  1:58 AM        START taking these medications    Details   predniSONE (DELTASONE) 20 MG tablet Take 2 tablets (40 mg total) by mouth once daily. for 5 days, Starting Sat 3/1/2025, Until Thu 3/6/2025, Print              Follow-up Information       SALAZAR Ferrell Jr., JAKYP In 2 days.    Specialty: Family Medicine  Contact information:  [address] [facility] [address]  [phone]               [facility] - Emergency Dept..    Specialty: Emergency Medicine  Why: As needed, If symptoms worsen  Contact information:  [address] [facility] [address] [phone]                               Scribe Attestation:   Scribe #1: I performed the above scribed service and the documentation accurately describes the services I performed. I attest to the accuracy of the note.     Attending:   Physician Attestation Statement for Scribe #1: I, Nic Hickman Jr., MD, personally performed the services described in this documentation, as scribed by Reji Velazco, in my presence, and it is both accurate and complete.       Scribe Attestation:   Scribe #2: I performed the above scribed service and the documentation accurately describes the services I performed. I attest to the accuracy of the note.    Attending Attestation:           Physician Attestation for Scribe:    Physician Attestation Statement for Scribe #2: I, Lexx Siddiqi MD, reviewed documentation, as scribed by Bharti Valdez in my presence, and it is both accurate and complete. I also acknowledge and confirm the content of the note done by Lazarusibe #1.           Clinical Impression       ICD-10-CM ICD-9-CM   1. Bronchitis  with acute wheezing  J40 490   2. SOB (shortness of breath)  R06.02 786.05       Disposition:   Disposition: Discharged  Condition: Stable        Lexx Siddiqi MD  03/01/25 0622

## 2025-03-04 ENCOUNTER — TELEPHONE (OUTPATIENT)
Dept: PULMONOLOGY | Facility: CLINIC | Age: 30
End: 2025-03-04
Payer: COMMERCIAL

## 2025-03-06 ENCOUNTER — LAB VISIT (OUTPATIENT)
Dept: LAB | Facility: HOSPITAL | Age: 30
End: 2025-03-06
Attending: FAMILY MEDICINE
Payer: COMMERCIAL

## 2025-03-06 ENCOUNTER — OFFICE VISIT (OUTPATIENT)
Dept: INTERNAL MEDICINE | Facility: CLINIC | Age: 30
End: 2025-03-06
Payer: COMMERCIAL

## 2025-03-06 VITALS
BODY MASS INDEX: 22.34 KG/M2 | DIASTOLIC BLOOD PRESSURE: 64 MMHG | SYSTOLIC BLOOD PRESSURE: 120 MMHG | WEIGHT: 134.06 LBS | HEART RATE: 74 BPM | OXYGEN SATURATION: 96 % | TEMPERATURE: 97 F | HEIGHT: 65 IN

## 2025-03-06 DIAGNOSIS — Z00.00 ROUTINE ADULT HEALTH MAINTENANCE: Primary | ICD-10-CM

## 2025-03-06 DIAGNOSIS — J45.50 SEVERE PERSISTENT ASTHMA WITHOUT COMPLICATION: ICD-10-CM

## 2025-03-06 DIAGNOSIS — Z00.00 ROUTINE ADULT HEALTH MAINTENANCE: ICD-10-CM

## 2025-03-06 PROCEDURE — 84439 ASSAY OF FREE THYROXINE: CPT | Performed by: FAMILY MEDICINE

## 2025-03-06 PROCEDURE — 84443 ASSAY THYROID STIM HORMONE: CPT | Performed by: FAMILY MEDICINE

## 2025-03-06 PROCEDURE — 36415 COLL VENOUS BLD VENIPUNCTURE: CPT | Mod: PO | Performed by: FAMILY MEDICINE

## 2025-03-06 PROCEDURE — 80061 LIPID PANEL: CPT | Performed by: FAMILY MEDICINE

## 2025-03-06 PROCEDURE — 99999 PR PBB SHADOW E&M-EST. PATIENT-LVL III: CPT | Mod: PBBFAC,,, | Performed by: FAMILY MEDICINE

## 2025-03-06 NOTE — PROGRESS NOTES
"Subjective:      Patient ID: Georgina Interiano is a 29 y.o. female.    Chief Complaint: Establish Care      History of Present Illness      Patient here to establish care and for ER follow-up.  She reports she works as a , had been out of work on a leave of absence to care for her ill mother for several months, since she returned to work regularly has been having significant problems with her asthma flaring up.  Had ER visit this past weekend again for asthma exacerbation.  She is seeing Allergy specialist and has appointment pending with pulmonologist.  Reports currently no wheezing, feels like flare-up has resolved.  She is generally in good health, reports her mother has some type of autoimmune problem which her doctors have been having a difficult time diagnosing as her levels have been fluctuating.  Has not had recent routine labs from primary care       Past Medical History:   Diagnosis Date    Asthma     Unspecified chronic bronchitis           History reviewed. No pertinent surgical history.  Family History   Problem Relation Name Age of Onset    Autoimmune disease Mother       Social History[1]  Review of patient's allergies indicates:  No Known Allergies    Review of Systems   Constitutional:  Negative for chills, fever and malaise/fatigue.   HENT:  Negative for congestion.    Respiratory:  Positive for shortness of breath and wheezing. Negative for cough.    Cardiovascular:  Negative for chest pain and palpitations.   Gastrointestinal:  Negative for abdominal pain and nausea.   Musculoskeletal:  Negative for myalgias.   Skin:  Positive for itching and rash.     Objective:       /64 (BP Location: Left arm, Patient Position: Sitting)   Pulse 74   Temp 97.2 °F (36.2 °C) (Tympanic)   Ht 5' 5" (1.651 m)   Wt 60.8 kg (134 lb 0.6 oz)   SpO2 96%   BMI 22.31 kg/m²   Physical Exam             Physical Exam  Vitals reviewed.   Constitutional:       General: She is not in acute " distress.     Appearance: Normal appearance. She is well-developed. She is not ill-appearing or diaphoretic.   HENT:      Head: Normocephalic and atraumatic.      Right Ear: Hearing, tympanic membrane, ear canal and external ear normal.      Left Ear: Hearing, tympanic membrane, ear canal and external ear normal.      Nose: Nose normal.      Mouth/Throat:      Pharynx: Uvula midline. No oropharyngeal exudate.   Eyes:      Conjunctiva/sclera: Conjunctivae normal.      Pupils: Pupils are equal, round, and reactive to light.   Neck:      Thyroid: No thyromegaly.      Trachea: No tracheal deviation.   Cardiovascular:      Rate and Rhythm: Normal rate and regular rhythm.      Heart sounds: Normal heart sounds. No murmur heard.  Pulmonary:      Effort: Pulmonary effort is normal. No respiratory distress.      Breath sounds: Normal breath sounds.   Abdominal:      General: Bowel sounds are normal.      Palpations: Abdomen is soft.      Tenderness: There is no abdominal tenderness. There is no guarding.      Hernia: No hernia is present.   Musculoskeletal:         General: Normal range of motion.      Cervical back: Normal range of motion and neck supple.   Lymphadenopathy:      Cervical: No cervical adenopathy.   Skin:     General: Skin is warm and dry.      Capillary Refill: Capillary refill takes less than 2 seconds.      Findings: Rash (bilateral LE - flat, fine macules, nonblanching) present.   Neurological:      General: No focal deficit present.      Mental Status: She is alert and oriented to person, place, and time.   Psychiatric:         Mood and Affect: Mood normal.         Behavior: Behavior normal.         Thought Content: Thought content normal.         Judgment: Judgment normal.         Assessment:     1. Routine adult health maintenance    2. Severe persistent asthma without complication      Plan:   Assessment & Plan             Routine adult health maintenance  -     Lipid Panel; Future; Expected date:  03/06/2025  -     TSH; Future; Expected date: 03/06/2025  -     T4, Free; Future; Expected date: 06/06/2025    Severe persistent asthma without complication    Continue current meds  Medication List with Changes/Refills   Current Medications    ALBUTEROL (PROVENTIL/VENTOLIN HFA) 90 MCG/ACTUATION INHALER    Inhale 1-2 puffs into the lungs every 6 (six) hours as needed for Wheezing. Rescue    ALBUTEROL-BUDESONIDE (AIRSUPRA) 90-80 MCG/ACTUATION    Inhale 2 puffs into the lungs every 4 (four) hours as needed (Shortness of Breath and/or wheezing).    ALBUTEROL-IPRATROPIUM (DUO-NEB) 2.5 MG-0.5 MG/3 ML NEBULIZER SOLUTION    Take 3 mLs by nebulization once. Rescue for 1 dose    DUPILUMAB 300 MG/2 ML PNIJ    Inject 2 mLs (300 mg total) into the skin every 14 (fourteen) days.    FLUTICASONE-UMECLIDIN-VILANTER (TRELEGY ELLIPTA) 200-62.5-25 MCG INHALER    Inhale 1 puff into the lungs once daily.    MONTELUKAST (SINGULAIR) 10 MG TABLET    Take 1 tablet (10 mg total) by mouth every evening.    TRIAMCINOLONE ACETONIDE 0.1% (KENALOG) 0.1 % CREAM    Apply topically 2 (two) times daily.   Discontinued Medications    PREDNISONE (DELTASONE) 20 MG TABLET    Take 2 tablets (40 mg total) by mouth once daily. for 5 days       This note was generated with the assistance of ambient listening technology. Verbal consent was obtained by the patient and accompanying visitor(s) for the recording of patient appointment to facilitate this note. I attest to having reviewed and edited the generated note for accuracy, though some syntax or spelling errors may persist. Please contact the author of this note for any clarification.            [1]   Social History  Socioeconomic History    Marital status: Single   Tobacco Use    Smoking status: Never    Smokeless tobacco: Never   Substance and Sexual Activity    Alcohol use: No    Drug use: No    Sexual activity: Yes     Partners: Male     Social Drivers of Health     Financial Resource Strain: Low Risk   (11/26/2024)    Overall Financial Resource Strain (CARDIA)     Difficulty of Paying Living Expenses: Not hard at all   Food Insecurity: No Food Insecurity (11/26/2024)    Hunger Vital Sign     Worried About Running Out of Food in the Last Year: Never true     Ran Out of Food in the Last Year: Never true   Transportation Needs: No Transportation Needs (11/26/2024)    TRANSPORTATION NEEDS     Transportation : No   Stress: No Stress Concern Present (11/26/2024)    [employer] - Occupational Stress Questionnaire     Feeling of Stress : Not at all   Housing Stability: Unknown (11/26/2024)    Housing Stability Vital Sign     Unable to Pay for Housing in the Last Year: No     Homeless in the Last Year: No

## 2025-03-07 ENCOUNTER — RESULTS FOLLOW-UP (OUTPATIENT)
Dept: INTERNAL MEDICINE | Facility: CLINIC | Age: 30
End: 2025-03-07

## 2025-03-07 LAB
CHOLEST SERPL-MCNC: 220 MG/DL (ref 120–199)
CHOLEST/HDLC SERPL: 3.2 {RATIO} (ref 2–5)
HDLC SERPL-MCNC: 69 MG/DL (ref 40–75)
HDLC SERPL: 31.4 % (ref 20–50)
LDLC SERPL CALC-MCNC: 133.6 MG/DL (ref 63–159)
NONHDLC SERPL-MCNC: 151 MG/DL
T4 FREE SERPL-MCNC: 1 NG/DL (ref 0.71–1.51)
TRIGL SERPL-MCNC: 87 MG/DL (ref 30–150)
TSH SERPL DL<=0.005 MIU/L-ACNC: 1.64 UIU/ML (ref 0.4–4)

## 2025-03-21 ENCOUNTER — OFFICE VISIT (OUTPATIENT)
Dept: PULMONOLOGY | Facility: CLINIC | Age: 30
End: 2025-03-21
Payer: COMMERCIAL

## 2025-03-21 VITALS
DIASTOLIC BLOOD PRESSURE: 64 MMHG | RESPIRATION RATE: 17 BRPM | WEIGHT: 138.56 LBS | BODY MASS INDEX: 23.09 KG/M2 | HEIGHT: 65 IN | SYSTOLIC BLOOD PRESSURE: 108 MMHG | OXYGEN SATURATION: 99 % | HEART RATE: 81 BPM

## 2025-03-21 DIAGNOSIS — J45.51 SEVERE PERSISTENT ASTHMA WITH EXACERBATION: Primary | ICD-10-CM

## 2025-03-21 DIAGNOSIS — Z23 NEED FOR VACCINATION: ICD-10-CM

## 2025-03-21 DIAGNOSIS — Z23 NEED FOR INFLUENZA VACCINATION: ICD-10-CM

## 2025-03-21 DIAGNOSIS — Z23 NEED FOR PNEUMOCOCCAL 20-VALENT CONJUGATE VACCINATION: ICD-10-CM

## 2025-03-21 PROCEDURE — 99999 PR PBB SHADOW E&M-EST. PATIENT-LVL V: CPT | Mod: PBBFAC,,, | Performed by: INTERNAL MEDICINE

## 2025-03-21 NOTE — PROGRESS NOTES
"                                         Pulmonary Outpatient Follow Up Visit     Subjective:       Patient ID: Georgina Interiano is a 29 y.o. female.    Chief Complaint: Asthma      HPI  Patient is a 29 y.o. female presenting for follow-up.      03/21/2025 asthma control test questionnaire score 16 on Trelegy Ellipta 200 mcg 1 puff daily montelukast and started Dupixent.      Also on air supply started by Allergy and immunology.  Last time I saw her I transferred to the emergency room due to hypoxemia.    She is known with history of near fatal asthma hospital presentation with need of NIV ABG from 2023 reviewed at [facility] with a CO2 of 52 currently on air supra and Trelegy Ellipta and montelukast      Evaluated by Allergy immunology elevated IgE awaiting to start Dupixent.      O2 sat at home 86% has severe shortness of breath, presented to the urgent care received nebulizer.      Complains of congestion coughing but no fever or chills.        Review of Systems   Constitutional:  Positive for fatigue.   Respiratory:  Positive for apnea, cough, sputum production, wheezing and dyspnea on extertion.        Encounter Medications[1]    Objective:     Vital Signs (Most Recent)  Vital Signs  Pulse: 81  Resp: 17  SpO2: 99 %  BP: 108/64  Patient Position: Sitting  Pain Score: 0-No pain  Height and Weight  Height: 5' 5" (165.1 cm)  Weight: 62.9 kg (138 lb 9 oz)  BSA (Calculated - sq m): 1.7 sq meters  BMI (Calculated): 23.1  Weight in (lb) to have BMI = 25: 149.9]  Wt Readings from Last 2 Encounters:   03/21/25 62.9 kg (138 lb 9 oz)   03/06/25 60.8 kg (134 lb 0.6 oz)       Physical Exam   Constitutional: She appears well-developed and well-nourished.   Pulmonary/Chest: She has decreased breath sounds. She has no wheezes. She has no rhonchi.       Laboratory  Lab Results   Component Value Date    WBC 11.09 02/28/2025    RBC 4.37 02/28/2025    HGB 14.0 02/28/2025    HCT 41.0 02/28/2025    MCV 94 02/28/2025 " "   MCH 32.0 (H) 2025    MCHC 34.1 2025    RDW 13.4 2025     2025    MPV 9.9 2025    GRAN 7.3 2025    GRAN 66.0 2025    LYMPH 2.1 2025    LYMPH 19.1 2025    MONO 1.1 (H) 2025    MONO 9.5 2025    EOS 0.5 2025    BASO 0.02 2025    EOSINOPHIL 4.7 2025    BASOPHIL 0.2 2025       BMP  Lab Results   Component Value Date     2025    K 3.1 (L) 2025     2025    CO2 23 2025    BUN 8 2025    CREATININE 0.8 2025    CALCIUM 9.5 2025    ANIONGAP 10 2025    ESTGFRAFRICA 117 2023    AST 13 2025    ALT 11 2025    PROT 7.1 2025       Lab Results   Component Value Date    BNP 91 2022       Lab Results   Component Value Date    TSH 1.641 2025       No results found for: "SEDRATE"    No results found for: "CRP"  Lab Results   Component Value Date    .0 (H) 2024        Lab Results   Component Value Date    ASPERGILLUS <0.10 2024     Lab Results   Component Value Date    AFUMIGATUSCL CLASS 0 2024        No results found for: "ACE"       Latest Reference Range & Units 25 15:28   POC PH 7.35 - 7.45  7.412   POC PCO2 35 - 45 mmHg 32.9 (L)   POC PO2 80 - 100 mmHg 55 (LL)   POC HCO3 24 - 28 mmol/L 20.9 (L)   POC SATURATED O2 95 - 100 % 89   Sample   ARTERIAL   POC BE -2 to 2 mmol/L -4 (L)   FiO2   21   DelSys   Room Air   Site   RR   Mode   SPONT   (LL): Data is critically low  (L): Data is abnormally low     No results found for: "ACE"     Diagnostic Results:  I have personally reviewed today the following studies :        Assessment/Plan:   Severe persistent asthma with exacerbation  -     Ambulatory referral/consult to Pulmonary Disease Management w/ Respiratory Therapist; Future; Expected date: 2025  -     Spirometry without Bronchodilator; Future; Expected date: 2025  -     Fraction of  Nitric Oxide; " Future; Expected date: 2025  -     Stress test, pulmonary; Future; Expected date: 2025    Need for influenza vaccination  -     Influenza - Trivalent - PF (ADULT)    Need for pneumococcal 20-valent conjugate vaccination  -     pneumoc 20-erika conj-dip cr(PF) (PREVNAR-20 (PF)) injection Syrg 0.5 mL    Need for vaccination  -     Influenza - Trivalent - PF (ADULT)         Airsupra    Trelegy Ellipta 200 mcg 1 puff daily.      Continue Dupixent     Continue montelukast.      Repeat spirometry and fraction  nitric oxide next visit    Influenza vaccination and Prevnar 20    Follow up in about 3 months (around 2025).    This note was prepared using voice recognition system and is likely to have sound alike errors that may have been overlooked even after proof reading.  Please call me with any questions    Discussed diagnosis, its evaluation, treatment and usual course. All questions answered.      Brisa Del Valle MD         [1]   Outpatient Encounter Medications as of 3/21/2025   Medication Sig Dispense Refill    albuterol-budesonide (AIRSUPRA) 90-80 mcg/actuation Inhale 2 puffs into the lungs every 4 (four) hours as needed (Shortness of Breath and/or wheezing). 10.7 g 11    dupilumab 300 mg/2 mL PnIj Inject 2 mLs (300 mg total) into the skin every 14 (fourteen) days. 4 mL 11    fluticasone-umeclidin-vilanter (TRELEGY ELLIPTA) 200-62.5-25 mcg inhaler Inhale 1 puff into the lungs once daily. 60 each 11    montelukast (SINGULAIR) 10 mg tablet Take 1 tablet (10 mg total) by mouth every evening. 90 tablet 3    [DISCONTINUED] albuterol (PROVENTIL/VENTOLIN HFA) 90 mcg/actuation inhaler Inhale 1-2 puffs into the lungs every 6 (six) hours as needed for Wheezing. Rescue 18 g 3    [] triamcinolone acetonide 0.1% (KENALOG) 0.1 % cream Apply topically 2 (two) times daily.      [DISCONTINUED] albuterol-ipratropium (DUO-NEB) 2.5 mg-0.5 mg/3 mL nebulizer solution Take 3 mLs by nebulization once. Rescue  for 1 dose 3 mL 0    [DISCONTINUED] dupilumab (DUPIXENT) 300 mg/2 mL Syrg Inject 2 mLs (300 mg total) into the skin every 14 (fourteen) days. 4 mL 0    [DISCONTINUED] dupilumab (DUPIXENT) 300 mg/2 mL Syrg Inject 4 mLs (600 mg total) into the skin once. for 1 dose 4 mL 0    [DISCONTINUED] predniSONE (DELTASONE) 20 MG tablet Take 2 tablets (40 mg total) by mouth once daily. for 5 days (Patient not taking: Reported on 3/6/2025) 10 tablet 0     Facility-Administered Encounter Medications as of 3/21/2025   Medication Dose Route Frequency Provider Last Rate Last Admin    [COMPLETED] pneumoc 20-erika conj-dip cr(PF) (PREVNAR-20 (PF)) injection Syrg 0.5 mL  0.5 mL Intramuscular 1 time in Clinic/HOD    0.5 mL at 03/21/25 0042

## 2025-04-09 ENCOUNTER — CLINICAL SUPPORT (OUTPATIENT)
Dept: PULMONOLOGY | Facility: CLINIC | Age: 30
End: 2025-04-09
Payer: COMMERCIAL

## 2025-04-09 ENCOUNTER — HOSPITAL ENCOUNTER (EMERGENCY)
Facility: HOSPITAL | Age: 30
Discharge: HOME | End: 2025-04-09
Attending: EMERGENCY MEDICINE
Payer: COMMERCIAL

## 2025-04-09 VITALS
HEART RATE: 109 BPM | TEMPERATURE: 99 F | BODY MASS INDEX: 20.83 KG/M2 | SYSTOLIC BLOOD PRESSURE: 135 MMHG | HEIGHT: 65 IN | WEIGHT: 125 LBS | OXYGEN SATURATION: 95 % | DIASTOLIC BLOOD PRESSURE: 72 MMHG | RESPIRATION RATE: 16 BRPM

## 2025-04-09 VITALS — HEART RATE: 140 BPM | HEIGHT: 65 IN | OXYGEN SATURATION: 94 % | BODY MASS INDEX: 21.49 KG/M2 | WEIGHT: 129 LBS

## 2025-04-09 DIAGNOSIS — J45.50 SEVERE PERSISTENT ASTHMA WITHOUT COMPLICATION: Primary | ICD-10-CM

## 2025-04-09 DIAGNOSIS — B34.9 VIRAL SYNDROME: Primary | ICD-10-CM

## 2025-04-09 DIAGNOSIS — R00.0 TACHYCARDIA: ICD-10-CM

## 2025-04-09 LAB
ABSOLUTE EOSINOPHIL (OHS): 0.01 K/UL
ABSOLUTE MONOCYTE (OHS): 1.6 K/UL (ref 0.3–1)
ABSOLUTE NEUTROPHIL COUNT (OHS): 9.96 K/UL (ref 1.8–7.7)
ALBUMIN SERPL BCP-MCNC: 3.7 G/DL (ref 3.5–5.2)
ALP SERPL-CCNC: 55 UNIT/L (ref 40–150)
ALT SERPL W/O P-5'-P-CCNC: 10 UNIT/L (ref 10–44)
ANION GAP (OHS): 11 MMOL/L (ref 8–16)
AST SERPL-CCNC: 17 UNIT/L (ref 11–45)
BASOPHILS # BLD AUTO: 0.03 K/UL
BASOPHILS NFR BLD AUTO: 0.2 %
BILIRUB SERPL-MCNC: 0.6 MG/DL (ref 0.1–1)
BUN SERPL-MCNC: 6 MG/DL (ref 6–20)
CALCIUM SERPL-MCNC: 9.4 MG/DL (ref 8.7–10.5)
CHLORIDE SERPL-SCNC: 99 MMOL/L (ref 95–110)
CO2 SERPL-SCNC: 25 MMOL/L (ref 23–29)
CREAT SERPL-MCNC: 0.9 MG/DL (ref 0.5–1.4)
ERYTHROCYTE [DISTWIDTH] IN BLOOD BY AUTOMATED COUNT: 12.4 % (ref 11.5–14.5)
GFR SERPLBLD CREATININE-BSD FMLA CKD-EPI: >60 ML/MIN/1.73/M2
GLUCOSE SERPL-MCNC: 103 MG/DL (ref 70–110)
HCT VFR BLD AUTO: 44.3 % (ref 37–48.5)
HGB BLD-MCNC: 15 GM/DL (ref 12–16)
IMM GRANULOCYTES # BLD AUTO: 0.05 K/UL (ref 0–0.04)
IMM GRANULOCYTES NFR BLD AUTO: 0.4 % (ref 0–0.5)
INFLUENZA A MOLECULAR (OHS): NEGATIVE
INFLUENZA B MOLECULAR (OHS): NEGATIVE
LYMPHOCYTES # BLD AUTO: 1.2 K/UL (ref 1–4.8)
MCH RBC QN AUTO: 31.7 PG (ref 27–31)
MCHC RBC AUTO-ENTMCNC: 33.9 G/DL (ref 32–36)
MCV RBC AUTO: 94 FL (ref 82–98)
NUCLEATED RBC (/100WBC) (OHS): 0 /100 WBC
PLATELET # BLD AUTO: 309 K/UL (ref 150–450)
PMV BLD AUTO: 10 FL (ref 9.2–12.9)
POTASSIUM SERPL-SCNC: 3.4 MMOL/L (ref 3.5–5.1)
PROT SERPL-MCNC: 9.1 GM/DL (ref 6–8.4)
RBC # BLD AUTO: 4.73 M/UL (ref 4–5.4)
RELATIVE EOSINOPHIL (OHS): 0.1 %
RELATIVE LYMPHOCYTE (OHS): 9.3 % (ref 18–48)
RELATIVE MONOCYTE (OHS): 12.5 % (ref 4–15)
RELATIVE NEUTROPHIL (OHS): 77.5 % (ref 38–73)
SARS-COV-2 RDRP RESP QL NAA+PROBE: NEGATIVE
SODIUM SERPL-SCNC: 135 MMOL/L (ref 136–145)
WBC # BLD AUTO: 12.85 K/UL (ref 3.9–12.7)

## 2025-04-09 PROCEDURE — 96360 HYDRATION IV INFUSION INIT: CPT

## 2025-04-09 PROCEDURE — 93005 ELECTROCARDIOGRAM TRACING: CPT

## 2025-04-09 PROCEDURE — 87502 INFLUENZA DNA AMP PROBE: CPT | Performed by: EMERGENCY MEDICINE

## 2025-04-09 PROCEDURE — 96361 HYDRATE IV INFUSION ADD-ON: CPT

## 2025-04-09 PROCEDURE — 87154 CUL TYP ID BLD PTHGN 6+ TRGT: CPT | Performed by: EMERGENCY MEDICINE

## 2025-04-09 PROCEDURE — 87040 BLOOD CULTURE FOR BACTERIA: CPT | Performed by: EMERGENCY MEDICINE

## 2025-04-09 PROCEDURE — 80053 COMPREHEN METABOLIC PANEL: CPT | Performed by: EMERGENCY MEDICINE

## 2025-04-09 PROCEDURE — U0002 COVID-19 LAB TEST NON-CDC: HCPCS | Performed by: EMERGENCY MEDICINE

## 2025-04-09 PROCEDURE — 93010 ELECTROCARDIOGRAM REPORT: CPT | Mod: ,,, | Performed by: INTERNAL MEDICINE

## 2025-04-09 PROCEDURE — 85025 COMPLETE CBC W/AUTO DIFF WBC: CPT | Performed by: EMERGENCY MEDICINE

## 2025-04-09 PROCEDURE — 99999 PR PBB SHADOW E&M-EST. PATIENT-LVL II: CPT | Mod: PBBFAC,,,

## 2025-04-09 PROCEDURE — 99285 EMERGENCY DEPT VISIT HI MDM: CPT | Mod: 25

## 2025-04-09 PROCEDURE — 25000003 PHARM REV CODE 250: Performed by: EMERGENCY MEDICINE

## 2025-04-09 RX ORDER — BENZONATATE 100 MG/1
100 CAPSULE ORAL 3 TIMES DAILY PRN
Qty: 20 CAPSULE | Refills: 0 | Status: SHIPPED | OUTPATIENT
Start: 2025-04-09 | End: 2025-04-19

## 2025-04-09 RX ORDER — PREDNISONE 20 MG/1
60 TABLET ORAL DAILY
Qty: 15 TABLET | Refills: 0 | Status: SHIPPED | OUTPATIENT
Start: 2025-04-09 | End: 2025-04-14

## 2025-04-09 RX ADMIN — SODIUM CHLORIDE 1000 ML: 9 INJECTION, SOLUTION INTRAVENOUS at 03:04

## 2025-04-09 NOTE — ED PROVIDER NOTES
SCRIBE #1 NOTE: I, Reji Velazco, am scribing for, and in the presence of, Nba Scherer Jr., MD. I have scribed the entire note.       History     Chief Complaint   Patient presents with    Shortness of Breath    Cough     Productive cough and SOB x 4 days. Hx. Of Asthma.      Review of patient's allergies indicates:  No Known Allergies      History of Present Illness     HPI    4/9/2025, 3:55 PM  History obtained from the patient and medical records      History of Present Illness: Georgina Interiano is a 29 y.o. female patient with a PMHx of asthma who presents to the Emergency Department for evaluation of SOB which onset within the past 4 days. Pt's son recently getting over influenza pneumonia. Symptoms are constant and moderate in severity. No mitigating or exacerbating factors reported. Associated sxs include cough and sore throat. Patient denies any CP, fever, sore throat, dizziness, back pain, and all other sxs at this time. No further complaints or concerns at this time.       Arrival mode: Personal Transportation    PCP: Oksana Vo MD        Past Medical History:  Past Medical History:   Diagnosis Date    Asthma     Unspecified chronic bronchitis        Past Surgical History:  History reviewed. No pertinent surgical history.      Family History:  Family History   Problem Relation Name Age of Onset    Autoimmune disease Mother         Social History:  Social History     Tobacco Use    Smoking status: Never    Smokeless tobacco: Never   Substance and Sexual Activity    Alcohol use: No    Drug use: No    Sexual activity: Yes     Partners: Male        Review of Systems     Review of Systems   Constitutional:  Negative for chills and fever.   HENT:  Positive for sore throat. Negative for congestion.    Respiratory:  Positive for cough and shortness of breath.    Cardiovascular:  Negative for chest pain.   Gastrointestinal:  Negative for abdominal pain, nausea and vomiting.   Genitourinary:   "Negative for dysuria.   Musculoskeletal:  Negative for back pain.   Skin:  Negative for rash.   Neurological:  Negative for dizziness, weakness, light-headedness, numbness and headaches.   Hematological:  Does not bruise/bleed easily.   All other systems reviewed and are negative.       Physical Exam     Initial Vitals [04/09/25 1435]   BP Pulse Resp Temp SpO2   129/76 (!) 136 20 99.6 °F (37.6 °C) (!) 94 %      MAP       --          Physical Exam  Nursing Notes and Vital Signs Reviewed.  Constitutional: Patient is in no acute distress. Well-developed and well-nourished.  Head: Atraumatic. Normocephalic.  Eyes:  EOM intact.  No scleral icterus.  ENT: Mucous membranes are moist.  Nares with mild congestion.  OP is clear   Neck:  Full ROM. No JVD.  Cardiovascular: Regular rate. Regular rhythm No murmurs, rubs, or gallops. Distal pulses are 2+ and symmetric  Pulmonary/Chest: No respiratory distress. Clear to auscultation bilaterally. No wheezing or rales.  Equal chest wall rise bilaterally. No accessory muscle use.  Abdominal: Soft and non-distended.  There is no tenderness.  No rebound, guarding, or rigidity. Good bowel sounds.  Genitourinary: No CVA tenderness.  No suprapubic tenderness  Musculoskeletal: Moves all extremities. No obvious deformities.  5 x 5 strength in all extremities   Skin: Warm and dry.  Neurological:  Alert, awake, and appropriate.  Normal speech.  No acute focal neurological deficits are appreciated.  Two through 12 intact bilaterally.  Psychiatric: Normal affect. Good eye contact. Appropriate in content.       ED Course   Procedures  ED Vital Signs:  Vitals:    04/09/25 1435 04/09/25 1502 04/09/25 1557 04/09/25 1558   BP: 129/76 119/69 117/77    Pulse: (!) 136 (!) 112 (!) 117 (!) 125   Resp: 20 15 15    Temp: 99.6 °F (37.6 °C)      TempSrc: Oral      SpO2: (!) 94% 97% 96%    Weight: 57.6 kg (127 lb)  56.7 kg (125 lb)    Height:   5' 5" (1.651 m)     04/09/25 1602   BP: 120/75   Pulse: 109 "   Resp: 15   Temp:    TempSrc:    SpO2: 96%   Weight:    Height:        Abnormal Lab Results:  Labs Reviewed   CBC WITH DIFFERENTIAL - Abnormal       Result Value    WBC 12.85 (*)     RBC 4.73      HGB 15.0      HCT 44.3      MCV 94      MCH 31.7 (*)     MCHC 33.9      RDW 12.4      Platelet Count 309      MPV 10.0      Nucleated RBC 0      Neut % 77.5 (*)     Lymph % 9.3 (*)     Mono % 12.5      Eos % 0.1      Basophil % 0.2      Imm Grans % 0.4      Neut # 9.96 (*)     Lymph # 1.20      Mono # 1.60 (*)     Eos # 0.01      Baso # 0.03      Imm Grans # 0.05 (*)    INFLUENZA A & B BY MOLECULAR - Normal    INFLUENZA A MOLECULAR Negative      INFLUENZA B MOLECULAR  Negative     SARS-COV-2 RNA AMPLIFICATION, QUAL - Normal    SARS COV-2 Molecular Negative     CULTURE, BLOOD   CULTURE, BLOOD   CBC W/ AUTO DIFFERENTIAL    Narrative:     The following orders were created for panel order CBC auto differential.  Procedure                               Abnormality         Status                     ---------                               -----------         ------                     CBC with Differential[[id number]]       Abnormal            Final result                 Please view results for these tests on the individual orders.   COMPREHENSIVE METABOLIC PANEL        All Lab Results:  Results for orders placed or performed during the hospital encounter of 04/09/25   EKG 12-lead    Collection Time: 04/09/25  2:33 PM   Result Value Ref Range    QRS Duration 68 ms    [facility] QTC Calculation 372 ms   Influenza A & B by Molecular    Collection Time: 04/09/25  3:19 PM    Specimen: Nasal Swab   Result Value Ref Range    INFLUENZA A MOLECULAR Negative Negative    INFLUENZA B MOLECULAR  Negative Negative   COVID-19 Rapid Screening    Collection Time: 04/09/25  3:19 PM   Result Value Ref Range    SARS COV-2 Molecular Negative Negative   CBC with Differential    Collection Time: 04/09/25  3:44 PM   Result Value Ref Range    WBC 12.85 (H)  3.90 - 12.70 K/uL    RBC 4.73 4.00 - 5.40 M/uL    HGB 15.0 12.0 - 16.0 gm/dL    HCT 44.3 37.0 - 48.5 %    MCV 94 82 - 98 fL    MCH 31.7 (H) 27.0 - 31.0 pg    MCHC 33.9 32.0 - 36.0 g/dL    RDW 12.4 11.5 - 14.5 %    Platelet Count 309 150 - 450 K/uL    MPV 10.0 9.2 - 12.9 fL    Nucleated RBC 0 <=0 /100 WBC    Neut % 77.5 (H) 38 - 73 %    Lymph % 9.3 (L) 18 - 48 %    Mono % 12.5 4 - 15 %    Eos % 0.1 <=8 %    Basophil % 0.2 <=1.9 %    Imm Grans % 0.4 0.0 - 0.5 %    Neut # 9.96 (H) 1.8 - 7.7 K/uL    Lymph # 1.20 1 - 4.8 K/uL    Mono # 1.60 (H) 0.3 - 1 K/uL    Eos # 0.01 <=0.5 K/uL    Baso # 0.03 <=0.2 K/uL    Imm Grans # 0.05 (H) 0.00 - 0.04 K/uL       Imaging Results:  Imaging Results              X-Ray Chest AP Portable (Final result)  Result time 04/09/25 15:26:08      Final result by Reymundo Schneider MD (04/09/25 15:26:08)                   Impression:      No acute findings.      Electronically signed by: Reymundo Schneider  Date:    04/09/2025  Time:    15:26               Narrative:    EXAMINATION:  XR CHEST AP PORTABLE    CLINICAL HISTORY:  cough;    TECHNIQUE:  Portable chest x-ray    COMPARISON:  02/28/2025    FINDINGS:  Lung fields are clear.    No pleural fluid or pneumothorax.    No adenopathy is identified.    No significant osseous abnormality.                                                The Emergency Provider reviewed the vital signs and test results, which are outlined above.     ED Discussion     4:02 PM: Reassessed pt at this time. Discussed with patient and/or family/caretaker all pertinent ED information and results. Discussed pt dx and plan of tx. Gave the patient all f/u and return to the ED instructions. All questions and concerns were addressed at this time. Patient and/or family/caretaker expresses understanding of information and instructions, and is comfortable with plan to discharge. Pt is stable for discharge.     I discussed with patient and/or family/caretaker that evaluation in the ED does not  suggest any emergent or life threatening medical conditions requiring immediate intervention beyond what was provided in the ED, and I believe patient is safe for discharge.  Regardless, an unremarkable evaluation in the ED does not preclude the development or presence of a serious of life threatening condition. As such, I instructed that the patient is to return immediately for any worsening or change in current symptoms.           Medical Decision Making  DDX;  Pneumonia, asthma, flu, covid        Amount and/or Complexity of Data Reviewed  Labs: ordered. Decision-making details documented in ED Course.  Radiology: ordered. Decision-making details documented in ED Course.  ECG/medicine tests: ordered and independent interpretation performed. Decision-making details documented in ED Course.    Risk  OTC drugs.  Prescription drug management.  Decision regarding hospitalization.                ED Medication(s):  Medications   sodium chloride 0.9% bolus 1,000 mL 1,000 mL (1,000 mLs Intravenous New Bag 4/9/25 4600)       New Prescriptions    BENZONATATE (TESSALON) 100 MG CAPSULE    Take 1 capsule (100 mg total) by mouth 3 (three) times daily as needed for Cough.    PREDNISONE (DELTASONE) 20 MG TABLET    Take 3 tablets (60 mg total) by mouth once daily. for 5 days        Follow-up Information       Oksana Vo MD.    Specialty: Internal Medicine  Contact information:  [address] [address]  [phone]                                 Scribe Attestation:   Scribe #1: I performed the above scribed service and the documentation accurately describes the services I performed. I attest to the accuracy of the note.     Attending:   Physician Attestation Statement for Scribe #1: I, Nba Scherer Jr., MD, personally performed the services described in this documentation, as scribed by Reji Velazco, in my presence, and it is both accurate and complete.           Clinical Impression       ICD-10-CM  ICD-9-CM   1. Viral syndrome  B34.9 079.99   2. Tachycardia  R00.0 785.0       Disposition:   Disposition: Discharged  Condition: Stable         Nba Scherer Jr., MD  04/09/25 4676

## 2025-04-09 NOTE — DISCHARGE INSTRUCTIONS
Alternate Motrin and Tylenol every 4 hours for fever and body aches.  Warm baths for breakthrough fevers.  Prednisone as prescribed.  Use your inhaler as needed.  Tessalon Perles for cough.  Robitussin DM for cough

## 2025-04-10 LAB
OHS QRS DURATION: 68 MS
OHS QTC CALCULATION: 372 MS

## 2025-04-11 ENCOUNTER — PATIENT MESSAGE (OUTPATIENT)
Dept: INTERNAL MEDICINE | Facility: CLINIC | Age: 30
End: 2025-04-11
Payer: COMMERCIAL

## 2025-04-11 ENCOUNTER — PATIENT MESSAGE (OUTPATIENT)
Dept: ALLERGY | Facility: CLINIC | Age: 30
End: 2025-04-11
Payer: COMMERCIAL

## 2025-04-12 ENCOUNTER — TELEPHONE (OUTPATIENT)
Dept: EMERGENCY MEDICINE | Facility: HOSPITAL | Age: 30
End: 2025-04-12
Payer: COMMERCIAL

## 2025-04-12 LAB
ACB COMPLEX DNA BLD POS QL NAA+NON-PROBE: NOT DETECTED
B FRAGILIS DNA BLD POS QL NAA+PROBE: NOT DETECTED
C ALBICANS DNA BLD POS QL NAA+PROBE: NOT DETECTED
C AURIS DNA BLD POS QL NAA+NON-PROBE: NOT DETECTED
C GATTII+NEOFOR DNA CSF QL NAA+NON-PROBE: NOT DETECTED
C GLABRATA DNA BLD POS QL NAA+PROBE: NOT DETECTED
C KRUSEI DNA BLD POS QL NAA+PROBE: NOT DETECTED
C PARAP DNA BLD POS QL NAA+PROBE: NOT DETECTED
C TROPICLS DNA BLD POS QL NAA+PROBE: NOT DETECTED
COLISTIN RES MCR-1 ISLT/SPM QL: NORMAL
E CLOAC COMP DNA BLD POS QL NAA+PROBE: NOT DETECTED
E COLI DNA BLD POS QL NAA+PROBE: NOT DETECTED
E FAECALIS+OTHR E SP RRNA BLD POS FISH: NOT DETECTED
E FAECIUM HSP60 BLD POS QL PROBE: NOT DETECTED
ENTEROBACTERALES DNA BLD POS NAA+N-PRB: NOT DETECTED
ESBL CFT TO CFT-CLAV IC RTO BD POS IMP: NORMAL
GP B STREP DNA CSF QL NAA+NON-PROBE: NOT DETECTED
HAEM INFLU DNA CSF QL NAA+NON-PROBE: NOT DETECTED
IMP CARBAPENEMASE ISLT QL IA.RAPID: NORMAL
K OXYTOCA OMPA BLD POS QL PROBE: NOT DETECTED
KLEBSIELLA SP DNA BLD POS QL NAA+NON-PRB: NOT DETECTED
KLEBSIELLA SP DNA BLD POS QL NAA+NON-PRB: NOT DETECTED
KPC CARBAPENEMASE ISLT QL IA.RAPID: NORMAL
L MONOCYTOG DNA CSF QL NAA+NON-PROBE: NOT DETECTED
MECA+MECC NOSE QL NAA+PROBE: NORMAL
MECA+MECC+MREJ ISLT/SPM QL: NORMAL
N MEN DNA CSF QL NAA+NON-PROBE: NOT DETECTED
NDM CARBAPENEMASE ISLT QL IA.RAPID: NORMAL
OXA-48-LIKE CRBPNASE ISLT QL IA.RAPID: NORMAL
P AERUGINOSA DNA BLD POS QL NAA+PROBE: NOT DETECTED
PROTEUS SP DNA BLD POS QL NAA+PROBE: NOT DETECTED
S AUREUS DNA BLD POS QL NAA+PROBE: NOT DETECTED
S ENT+BONG DNA STL QL NAA+NON-PROBE: NOT DETECTED
S EPIDERMIDIS HSP60 BLD POS QL PROBE: NOT DETECTED
S LUGDUNENSIS SODA BLD POS QL PROBE: NOT DETECTED
S MALTOPH DNA BLD POS QL NAA+PROBE: NOT DETECTED
S MARCESCENS DNA BLD POS QL NAA+PROBE: NOT DETECTED
S PNEUM DNA CSF QL NAA+NON-PROBE: NOT DETECTED
S PYOGENES HSP60 BLD POS QL PROBE: NOT DETECTED
STAPH SP TUF BLD POS QL PROBE: NOT DETECTED
STREPTOCOCCUS SP TUF BLD POS QL PROBE: NOT DETECTED
VAN(A+B+C1+C2) GENES ISLT/SPM: NORMAL
VIM CARBAPENEMASE ISLT QL IA.RAPID: NORMAL

## 2025-04-12 NOTE — TELEPHONE ENCOUNTER
Called patient after micro called that one of the anaerobic BC bottles grew out gram negative rods. Dr. Ryan had this CN call patient and ask her to come back for re-evaluation and treatment. Patient aware and she is out of town until tomorrow, but will come straight to the ED and seek further treatment.

## 2025-04-13 ENCOUNTER — HOSPITAL ENCOUNTER (EMERGENCY)
Facility: HOSPITAL | Age: 30
Discharge: HOME | End: 2025-04-13
Attending: EMERGENCY MEDICINE
Payer: COMMERCIAL

## 2025-04-13 VITALS
TEMPERATURE: 99 F | RESPIRATION RATE: 22 BRPM | DIASTOLIC BLOOD PRESSURE: 60 MMHG | BODY MASS INDEX: 20.83 KG/M2 | WEIGHT: 125 LBS | SYSTOLIC BLOOD PRESSURE: 120 MMHG | HEIGHT: 65 IN | OXYGEN SATURATION: 96 % | HEART RATE: 98 BPM

## 2025-04-13 DIAGNOSIS — J02.9 SORE THROAT: ICD-10-CM

## 2025-04-13 DIAGNOSIS — Z87.09 HISTORY OF ASTHMA: ICD-10-CM

## 2025-04-13 DIAGNOSIS — J06.9 VIRAL URI WITH COUGH: Primary | ICD-10-CM

## 2025-04-13 LAB
ABSOLUTE EOSINOPHIL (OHS): 0.29 K/UL
ABSOLUTE MONOCYTE (OHS): 1.42 K/UL (ref 0.3–1)
ABSOLUTE NEUTROPHIL COUNT (OHS): 12.52 K/UL (ref 1.8–7.7)
ALBUMIN SERPL BCP-MCNC: 3.1 G/DL (ref 3.5–5.2)
ALP SERPL-CCNC: 91 UNIT/L (ref 40–150)
ALT SERPL W/O P-5'-P-CCNC: 16 UNIT/L (ref 10–44)
ANION GAP (OHS): 7 MMOL/L (ref 8–16)
AST SERPL-CCNC: 18 UNIT/L (ref 11–45)
BACTERIA #/AREA URNS AUTO: NORMAL /HPF
BASOPHILS # BLD AUTO: 0.06 K/UL
BASOPHILS NFR BLD AUTO: 0.4 %
BILIRUB SERPL-MCNC: 0.5 MG/DL (ref 0.1–1)
BILIRUB UR QL STRIP.AUTO: NEGATIVE
BUN SERPL-MCNC: 5 MG/DL (ref 6–20)
CALCIUM SERPL-MCNC: 8.9 MG/DL (ref 8.7–10.5)
CHLORIDE SERPL-SCNC: 106 MMOL/L (ref 95–110)
CLARITY UR: CLEAR
CO2 SERPL-SCNC: 26 MMOL/L (ref 23–29)
COLOR UR AUTO: ABNORMAL
CREAT SERPL-MCNC: 0.8 MG/DL (ref 0.5–1.4)
ERYTHROCYTE [DISTWIDTH] IN BLOOD BY AUTOMATED COUNT: 12.9 % (ref 11.5–14.5)
GFR SERPLBLD CREATININE-BSD FMLA CKD-EPI: >60 ML/MIN/1.73/M2
GLUCOSE SERPL-MCNC: 87 MG/DL (ref 70–110)
GLUCOSE UR QL STRIP: NEGATIVE
GROUP A STREP MOLECULAR (OHS): NEGATIVE
HCT VFR BLD AUTO: 40.6 % (ref 37–48.5)
HGB BLD-MCNC: 13.3 GM/DL (ref 12–16)
HGB UR QL STRIP: ABNORMAL
IMM GRANULOCYTES # BLD AUTO: 0.14 K/UL (ref 0–0.04)
IMM GRANULOCYTES NFR BLD AUTO: 0.9 % (ref 0–0.5)
KETONES UR QL STRIP: NEGATIVE
LACTATE SERPL-SCNC: 0.9 MMOL/L (ref 0.5–2.2)
LEUKOCYTE ESTERASE UR QL STRIP: NEGATIVE
LYMPHOCYTES # BLD AUTO: 1.67 K/UL (ref 1–4.8)
MCH RBC QN AUTO: 31.2 PG (ref 27–31)
MCHC RBC AUTO-ENTMCNC: 32.8 G/DL (ref 32–36)
MCV RBC AUTO: 95 FL (ref 82–98)
MICROSCOPIC COMMENT: NORMAL
NITRITE UR QL STRIP: NEGATIVE
NUCLEATED RBC (/100WBC) (OHS): 0 /100 WBC
PH UR STRIP: 8 [PH]
PLATELET # BLD AUTO: 434 K/UL (ref 150–450)
PLATELET BLD QL SMEAR: ABNORMAL
PMV BLD AUTO: 10 FL (ref 9.2–12.9)
POTASSIUM SERPL-SCNC: 3.6 MMOL/L (ref 3.5–5.1)
PROCALCITONIN SERPL-MCNC: 0.02 NG/ML
PROT SERPL-MCNC: 8.3 GM/DL (ref 6–8.4)
PROT UR QL STRIP: NEGATIVE
RBC # BLD AUTO: 4.26 M/UL (ref 4–5.4)
RBC #/AREA URNS AUTO: 4 /HPF (ref 0–4)
RELATIVE EOSINOPHIL (OHS): 1.8 %
RELATIVE LYMPHOCYTE (OHS): 10.4 % (ref 18–48)
RELATIVE MONOCYTE (OHS): 8.8 % (ref 4–15)
RELATIVE NEUTROPHIL (OHS): 77.7 % (ref 38–73)
SODIUM SERPL-SCNC: 139 MMOL/L (ref 136–145)
SP GR UR STRIP: 1.01
SQUAMOUS #/AREA URNS AUTO: 2 /HPF
UROBILINOGEN UR STRIP-ACNC: NEGATIVE EU/DL
WBC # BLD AUTO: 16.1 K/UL (ref 3.9–12.7)
WBC #/AREA URNS AUTO: 2 /HPF (ref 0–5)

## 2025-04-13 PROCEDURE — 81001 URINALYSIS AUTO W/SCOPE: CPT | Performed by: EMERGENCY MEDICINE

## 2025-04-13 PROCEDURE — 83605 ASSAY OF LACTIC ACID: CPT | Performed by: EMERGENCY MEDICINE

## 2025-04-13 PROCEDURE — 85025 COMPLETE CBC W/AUTO DIFF WBC: CPT | Performed by: EMERGENCY MEDICINE

## 2025-04-13 PROCEDURE — 84145 PROCALCITONIN (PCT): CPT | Performed by: EMERGENCY MEDICINE

## 2025-04-13 PROCEDURE — 99283 EMERGENCY DEPT VISIT LOW MDM: CPT

## 2025-04-13 PROCEDURE — 80053 COMPREHEN METABOLIC PANEL: CPT | Performed by: EMERGENCY MEDICINE

## 2025-04-13 PROCEDURE — 87651 STREP A DNA AMP PROBE: CPT | Performed by: EMERGENCY MEDICINE

## 2025-04-13 PROCEDURE — 87040 BLOOD CULTURE FOR BACTERIA: CPT | Performed by: EMERGENCY MEDICINE

## 2025-04-13 RX ORDER — IPRATROPIUM BROMIDE AND ALBUTEROL SULFATE 2.5; .5 MG/3ML; MG/3ML
3 SOLUTION RESPIRATORY (INHALATION) EVERY 4 HOURS PRN
Qty: 120 EACH | Refills: 3 | Status: SHIPPED | OUTPATIENT
Start: 2025-04-13 | End: 2026-04-13

## 2025-04-13 NOTE — ED PROVIDER NOTES
SCRIBE #1 NOTE: I, Minnie Serrano, am scribing for, and in the presence of, Maegan Carlson MD. I have scribed the entire note.       History     Chief Complaint   Patient presents with    Abnormal Lab     Was called to return to ED for + Blood culture     Review of patient's allergies indicates:  No Known Allergies      History of Present Illness     HPI    4/13/2025, 3:36 PM  History obtained from the patient and medical records      History of Present Illness: Georgina Interiano is a 29 y.o. female patient with a PMHx of asthma and unspecified chronic bronchitis who was called back to the ED because of a positive blood culture. Pt was seen here Wednesday and called [facility].  Pt reports feeling fine other than the sore throat, cough, and congestion that she originally presented to the ED for. No mitigating or exacerbating factors reported. Patient denies any fever, abdominal pain, or vomiting. No further complaints or concerns at this time.       Arrival mode: Personal Transportation    PCP: Oksana Vo MD        Past Medical History:  Past Medical History:   Diagnosis Date    Asthma     Unspecified chronic bronchitis        Past Surgical History:  No past surgical history on file.      Family History:  Family History   Problem Relation Name Age of Onset    Autoimmune disease Mother         Social History:  Social History     Tobacco Use    Smoking status: Never    Smokeless tobacco: Never   Substance and Sexual Activity    Alcohol use: No    Drug use: No    Sexual activity: Yes     Partners: Male        Review of Systems     Review of Systems   Constitutional:  Negative for fever.   HENT:  Positive for congestion and sore throat.    Respiratory:  Positive for cough. Negative for shortness of breath.    Cardiovascular:  Negative for chest pain.   Gastrointestinal:  Negative for abdominal pain, nausea and vomiting.   Genitourinary:  Negative for dysuria.   Musculoskeletal:  Negative for back  "pain.   Skin:  Negative for rash.   Neurological:  Negative for weakness.   Hematological:  Does not bruise/bleed easily.   All other systems reviewed and are negative.     Physical Exam     Initial Vitals [04/13/25 1438]   BP Pulse Resp Temp SpO2   120/60 (!) 126 20 98.6 °F (37 °C) 95 %      MAP       --          Physical Exam  Nursing Notes and Vital Signs Reviewed.  Constitutional: Patient is in no apparent distress. Well-developed and well-nourished.  Head: Atraumatic. Normocephalic.  Eyes: PERRL. EOM intact. Conjunctivae are not pale. No scleral icterus.  ENT: Nasal congestion noted. Mucous membranes are moist. Erythema to throat, no exudates.   Neck: Supple. Full ROM. No lymphadenopathy.  Cardiovascular: Slightly tachycardic. Regular rhythm. No murmurs, rubs, or gallops. Distal pulses are 2+ and symmetric.  Pulmonary/Chest: No respiratory distress. Clear to auscultation bilaterally. Faint expiratory wheezing.  Abdominal: Soft and non-distended.  There is no tenderness.  No rebound, guarding, or rigidity. Good bowel sounds.  Musculoskeletal: Moves all extremities. No obvious deformities. No edema. No calf tenderness.  Skin: Warm and dry.  Neurological:  Alert, awake, and appropriate.  Normal speech.  No acute focal neurological deficits are appreciated.  Psychiatric: Normal affect. Good eye contact. Appropriate in content.     ED Course   Procedures  ED Vital Signs:  Vitals:    04/13/25 1438 04/13/25 1608   BP: 120/60    Pulse: (!) 126 98   Resp: 20 (!) 22   Temp: 98.6 °F (37 °C)    TempSrc: Oral    SpO2: 95% 96%   Weight: 56.7 kg (125 lb)    Height: 5' 5" (1.651 m)        Abnormal Lab Results:  Labs Reviewed   COMPREHENSIVE METABOLIC PANEL - Abnormal       Result Value    Sodium 139      Potassium 3.6      Chloride 106      CO2 26      Glucose 87      BUN 5 (*)     Creatinine 0.8      Calcium 8.9      Protein Total 8.3      Albumin 3.1 (*)     Bilirubin Total 0.5      ALP 91      AST 18      ALT 16      Anion " Gap 7 (*)     eGFR >60     URINALYSIS, REFLEX TO URINE CULTURE - Abnormal    Color, UA Straw      Appearance, UA Clear      pH, UA 8.0      Spec Grav UA 1.010      Protein, UA Negative      Glucose, UA Negative      Ketones, UA Negative      Bilirubin, UA Negative      Blood, UA 3+ (*)     Nitrites, UA Negative      Urobilinogen, UA Negative      Leukocyte Esterase, UA Negative     CBC WITH DIFFERENTIAL - Abnormal    WBC 16.10 (*)     RBC 4.26      HGB 13.3      HCT 40.6      MCV 95      MCH 31.2 (*)     MCHC 32.8      RDW 12.9      Platelet Count 434      MPV 10.0      Nucleated RBC 0      Neut % 77.7 (*)     Lymph % 10.4 (*)     Mono % 8.8      Eos % 1.8      Basophil % 0.4      Imm Grans % 0.9 (*)     Neut # 12.52 (*)     Lymph # 1.67      Mono # 1.42 (*)     Eos # 0.29      Baso # 0.06      Imm Grans # 0.14 (*)    PLATELET REVIEW - Abnormal    Platelet Estimate Increased (*)    GROUP A STREP, MOLECULAR - Normal    Group A Strep Molecular Negative      Narrative:     Arcanobacterium haemolyticum and Beta Streptococcus group C and G will not be detected by this test method.  Please order Throat Culture ([id number]) if suspected.       CULTURE, BLOOD - Normal    Blood Culture No Growth After 5 Days     CULTURE, BLOOD - Normal    Blood Culture No Growth After 5 Days     LACTIC ACID, PLASMA - Normal    Lactic Acid Level 0.9      Narrative:     Falsely low lactic acid results can be found in samples containing >=13.0 mg/dL total bilirubin and/or >=3.5 mg/dL direct bilirubin.    PROCALCITONIN - Normal    Procalcitonin 0.02     CBC W/ AUTO DIFFERENTIAL    Narrative:     The following orders were created for panel order CBC auto differential.  Procedure                               Abnormality         Status                     ---------                               -----------         ------                     CBC with Differential[[id number]]       Abnormal            Final result                 Please view results for  these tests on the individual orders.   URINALYSIS MICROSCOPIC    RBC, UA 4      WBC, UA 2      Bacteria, UA Rare      Squamous Epithelial Cells, UA 2      Microscopic Comment       PLATELET REVIEW   GREY TOP URINE HOLD        All Lab Results:  None.    Imaging Results:  Imaging Results    None              The Emergency Provider reviewed the vital signs and test results, which are outlined above.     ED Discussion     7:04 PM: Reassessed pt at this time. Discussed with patient and/or family/caretaker all pertinent ED information and results. Discussed pt dx and plan of tx. Gave the patient all f/u and return to the ED instructions. All questions and concerns were addressed at this time. Patient and/or family/caretaker expresses understanding of information and instructions, and is comfortable with plan to discharge. Pt is stable for discharge.     I discussed with patient and/or family/caretaker that evaluation in the ED does not suggest any emergent or life threatening medical conditions requiring immediate intervention beyond what was provided in the ED, and I believe patient is safe for discharge.  Regardless, an unremarkable evaluation in the ED does not preclude the development or presence of a serious of life threatening condition. As such, I instructed that the patient is to return immediately for any worsening or change in current symptoms.       Medical Decision Making  DDX: 1. Contaminated Blood Culture Bottle 2. Viral URI 3. Bacteremia    Blood culture bottle results only grew out Gram negative destinee from one culture also PCR sent and no organism noted so suspect contaminated, patient still with URI symptoms, but doesn't appear ill, strep test negative, WBC ct elevated but also on steroids, remainder of labs stable, discussed with hospital medicine, both agree patient can be safely discharge, low suspicion for bacteremia given only one blood culture bottle positive and no actual organism grown.     Amount  and/or Complexity of Data Reviewed  External Data Reviewed: labs and notes.  Labs: ordered. Decision-making details documented in ED Course.                ED Medication(s):  Medications - No data to display    Discharge Medication List as of 4/13/2025  7:03 PM           Follow-up Information       Oksana Vo MD. Schedule an appointment as soon as possible for a visit in 2 days.    Specialty: Internal Medicine  Why: Return to the Emergency Room, If symptoms worsen  Contact information:  [address] [facility] [address]  [phone]                                 Scribe Attestation:   Scribe #1: I performed the above scribed service and the documentation accurately describes the services I performed. I attest to the accuracy of the note.     Attending:   Physician Attestation Statement for Scribe #1: I, Maegan Carlson MD, personally performed the services described in this documentation, as scribed by Minnie Serrano, in my presence, and it is both accurate and complete.           Clinical Impression       ICD-10-CM ICD-9-CM   1. Viral URI with cough  J06.9 465.9   2. Sore throat  J02.9 462   3. History of asthma  Z87.09 V12.69       Disposition:   Disposition: Discharged  Condition: Stable         Maegan Carlson MD  04/19/25 8456

## 2025-04-13 NOTE — Clinical Note
"Georgina Rodriguezia" Jaydon was seen and treated in our emergency department on 4/13/2025.  She may return to work on 04/15/2025.       If you have any questions or concerns, please don't hesitate to call.      Simona TRAORE RN    "

## 2025-04-15 LAB — BACTERIA BLD CULT: NORMAL

## 2025-04-16 DIAGNOSIS — J45.50 SEVERE PERSISTENT ASTHMA WITHOUT COMPLICATION: Primary | ICD-10-CM

## 2025-04-17 LAB
BACTERIA BLD CULT: ABNORMAL
BACTERIA BLD CULT: ABNORMAL
GRAM STN SPEC: ABNORMAL

## 2025-04-18 ENCOUNTER — RESULTS FOLLOW-UP (OUTPATIENT)
Dept: EMERGENCY MEDICINE | Facility: HOSPITAL | Age: 30
End: 2025-04-18

## 2025-04-19 LAB
BACTERIA BLD CULT: NORMAL
BACTERIA BLD CULT: NORMAL

## 2025-05-27 ENCOUNTER — TELEPHONE (OUTPATIENT)
Dept: PULMONOLOGY | Facility: CLINIC | Age: 30
End: 2025-05-27
Payer: COMMERCIAL

## 2025-06-10 ENCOUNTER — OFFICE VISIT (OUTPATIENT)
Dept: INTERNAL MEDICINE | Facility: CLINIC | Age: 30
End: 2025-06-10
Payer: COMMERCIAL

## 2025-06-10 VITALS
RESPIRATION RATE: 19 BRPM | DIASTOLIC BLOOD PRESSURE: 67 MMHG | WEIGHT: 124.56 LBS | SYSTOLIC BLOOD PRESSURE: 115 MMHG | OXYGEN SATURATION: 95 % | HEIGHT: 65 IN | HEART RATE: 90 BPM | BODY MASS INDEX: 20.75 KG/M2 | TEMPERATURE: 99 F

## 2025-06-10 DIAGNOSIS — J45.50 SEVERE PERSISTENT ASTHMA WITHOUT COMPLICATION: Primary | ICD-10-CM

## 2025-06-10 PROCEDURE — 99999 PR PBB SHADOW E&M-EST. PATIENT-LVL IV: CPT | Mod: PBBFAC,,, | Performed by: PHYSICIAN ASSISTANT

## 2025-06-10 PROCEDURE — 1159F MED LIST DOCD IN RCRD: CPT | Mod: CPTII,S$GLB,, | Performed by: PHYSICIAN ASSISTANT

## 2025-06-10 PROCEDURE — 99213 OFFICE O/P EST LOW 20 MIN: CPT | Mod: S$GLB,,, | Performed by: PHYSICIAN ASSISTANT

## 2025-06-10 PROCEDURE — 3078F DIAST BP <80 MM HG: CPT | Mod: CPTII,S$GLB,, | Performed by: PHYSICIAN ASSISTANT

## 2025-06-10 PROCEDURE — 1160F RVW MEDS BY RX/DR IN RCRD: CPT | Mod: CPTII,S$GLB,, | Performed by: PHYSICIAN ASSISTANT

## 2025-06-10 PROCEDURE — 3074F SYST BP LT 130 MM HG: CPT | Mod: CPTII,S$GLB,, | Performed by: PHYSICIAN ASSISTANT

## 2025-06-10 PROCEDURE — 3008F BODY MASS INDEX DOCD: CPT | Mod: CPTII,S$GLB,, | Performed by: PHYSICIAN ASSISTANT

## 2025-06-11 NOTE — PROGRESS NOTES
"Subjective:       Patient ID: Georgina Interiano is a 29 y.o. female.    Chief Complaint: Asthma (Checkup/ FMLA paperwork)    History of Present Illness    HPI:  Ms. Interiano has severe asthma, diagnosed 2-3 years ago. Her symptoms have worsened in recent weeks due to summer heat, humidity, and work conditions as a  without air conditioning. She has increased the frequency of her breathing treatments to every 4-6 hours, compared to twice weekly or as needed previously. She has occasionally called in sick to work due to hospitalization or feeling too unwell to work in the heat. Wearing a mask, as recommended by her pulmonologist, was helpful before the summer heat but has become difficult to tolerate in current conditions. She is evaluated by a pulmonologist at the [facility], with her next appointment scheduled for 2 weeks from today. She has a history of requiring BiPAP treatment 5 times in the past, which she describes as the worst her illness has been. She expresses concern about her ability to continue her job long-term due to her health issues.    She denies ever being intubated for her asthma.         Review of Systems   Constitutional:  Negative for chills, fatigue, fever and unexpected weight change.   Eyes:  Negative for visual disturbance.   Respiratory:  Positive for shortness of breath (intermittent, episodic, asthma).    Cardiovascular:  Negative for chest pain.   Musculoskeletal:  Negative for myalgias.   Neurological:  Negative for headaches.         Objective:     Vitals:    06/10/25 1107   BP: 115/67   BP Location: Right arm   Patient Position: Sitting   Pulse: 90   Resp: 19   Temp: 98.7 °F (37.1 °C)   TempSrc: Tympanic   SpO2: 95%   Weight: 56.5 kg (124 lb 9 oz)   Height: 5' 5" (1.651 m)         Physical Exam  Vitals and nursing note reviewed.   Constitutional:       General: She is not in acute distress.     Appearance: She is well-developed.   HENT:      Head: " Normocephalic and atraumatic.   Eyes:      General: Lids are normal. No scleral icterus.     Extraocular Movements: Extraocular movements intact.      Conjunctiva/sclera: Conjunctivae normal.   Pulmonary:      Effort: Pulmonary effort is normal. No respiratory distress.   Neurological:      Mental Status: She is alert.      Cranial Nerves: No cranial nerve deficit.   Psychiatric:         Mood and Affect: Mood and affect normal.           Assessment:     1. Severe persistent asthma without complication          Plan:   1. Severe persistent asthma without complication  Overview:  Followed by Pulmonology, continue current treatment plan   - High risk patient  - Patient with multiple ED visits per year   - Patient with multiple rounds of oral and IV/IM steroids per year      - 12/05/2024: Serum IgE to Zone 6 Aeroallergens negative; Serum IgE elevated at 458  - 11/26/2024: ED visit for Asthma Exacerbation- Acute Respiratory Failure   - 05/17/2024: ED visit for Asthma Exacerbation  - 01/20/2024: ED visit for Asthma Exacerbation          Assessment & Plan     Advised not to feel ashamed about needing breathing treatments every 4-6 hours.   Continued Albuterol/Ipratropium (DuoNeb) nebulizer solution.   Continued Trelegy inhaler.   Continued Singulair.   Continued allergy shots administered every 2 weeks.   Follow up in 2 weeks with pulmonology.   Provided FMLA paperwork to complete with supervisor.       FMLA completed for intermittent leave for severe asthma and frequent flare-ups for a period of 1 year  Will scan into chart  Advised patient that  position is not favorable for her lung condition and may be better suited working indoors, she verbalized understanding and may consider this with her manager    Future Appointments   Date Time Provider Department Center   6/20/2025  2:30 PM [facility]   [facility]20[facility]20[facility]  [facility]   6/20/2025   3:30 PM [facility]   6/20/2025  4:00 PM [facility]

## 2025-06-16 ENCOUNTER — HOSPITAL ENCOUNTER (EMERGENCY)
Facility: HOSPITAL | Age: 30
Discharge: HOME | End: 2025-06-17
Attending: EMERGENCY MEDICINE
Payer: COMMERCIAL

## 2025-06-16 DIAGNOSIS — R07.9 CHEST PAIN: ICD-10-CM

## 2025-06-16 DIAGNOSIS — J45.901 SEVERE ASTHMA WITH ACUTE EXACERBATION, UNSPECIFIED WHETHER PERSISTENT: Primary | ICD-10-CM

## 2025-06-16 LAB
ABSOLUTE NEUTROPHIL MANUAL (OHS): 8.7 K/UL
ALBUMIN SERPL BCP-MCNC: 4.2 G/DL (ref 3.5–5.2)
ALP SERPL-CCNC: 79 UNIT/L (ref 40–150)
ALT SERPL W/O P-5'-P-CCNC: 12 UNIT/L (ref 10–44)
ANION GAP (OHS): 12 MMOL/L (ref 8–16)
AST SERPL-CCNC: 18 UNIT/L (ref 11–45)
BASOPHILS NFR BLD MANUAL: 1 %
BILIRUB SERPL-MCNC: 0.5 MG/DL (ref 0.1–1)
BNP SERPL-MCNC: <10 PG/ML (ref 0–99)
BUN SERPL-MCNC: 8 MG/DL (ref 6–20)
CALCIUM SERPL-MCNC: 9.6 MG/DL (ref 8.7–10.5)
CHLORIDE SERPL-SCNC: 107 MMOL/L (ref 95–110)
CO2 SERPL-SCNC: 20 MMOL/L (ref 23–29)
CREAT SERPL-MCNC: 0.8 MG/DL (ref 0.5–1.4)
EOSINOPHIL NFR BLD MANUAL: 16 % (ref 0–8)
ERYTHROCYTE [DISTWIDTH] IN BLOOD BY AUTOMATED COUNT: 13 % (ref 11.5–14.5)
GFR SERPLBLD CREATININE-BSD FMLA CKD-EPI: >60 ML/MIN/1.73/M2
GLUCOSE SERPL-MCNC: 101 MG/DL (ref 70–110)
HCT VFR BLD AUTO: 45.9 % (ref 37–48.5)
HGB BLD-MCNC: 15.4 GM/DL (ref 12–16)
LARGE/GIANT PLATELETS (OHS): PRESENT
LYMPHOCYTES NFR BLD MANUAL: 26 % (ref 18–48)
MCH RBC QN AUTO: 31.6 PG (ref 27–31)
MCHC RBC AUTO-ENTMCNC: 33.6 G/DL (ref 32–36)
MCV RBC AUTO: 94 FL (ref 82–98)
MONOCYTES NFR BLD MANUAL: 5 % (ref 4–15)
NEUTROPHILS NFR BLD MANUAL: 51 % (ref 38–73)
NEUTS BAND NFR BLD MANUAL: 1 %
NUCLEATED RBC (/100WBC) (OHS): 0 /100 WBC
PLATELET # BLD AUTO: 430 K/UL (ref 150–450)
PLATELET BLD QL SMEAR: ABNORMAL
PMV BLD AUTO: 9.9 FL (ref 9.2–12.9)
POTASSIUM SERPL-SCNC: 3.7 MMOL/L (ref 3.5–5.1)
PROT SERPL-MCNC: 8.9 GM/DL (ref 6–8.4)
RBC # BLD AUTO: 4.87 M/UL (ref 4–5.4)
SODIUM SERPL-SCNC: 139 MMOL/L (ref 136–145)
TROPONIN I SERPL DL<=0.01 NG/ML-MCNC: <0.006 NG/ML
WBC # BLD AUTO: 16.7 K/UL (ref 3.9–12.7)

## 2025-06-16 PROCEDURE — 93005 ELECTROCARDIOGRAM TRACING: CPT

## 2025-06-16 PROCEDURE — 25000003 PHARM REV CODE 250: Performed by: EMERGENCY MEDICINE

## 2025-06-16 PROCEDURE — 25000242 PHARM REV CODE 250 ALT 637 W/ HCPCS: Performed by: EMERGENCY MEDICINE

## 2025-06-16 PROCEDURE — 80053 COMPREHEN METABOLIC PANEL: CPT | Performed by: EMERGENCY MEDICINE

## 2025-06-16 PROCEDURE — 96361 HYDRATE IV INFUSION ADD-ON: CPT

## 2025-06-16 PROCEDURE — 83880 ASSAY OF NATRIURETIC PEPTIDE: CPT | Performed by: EMERGENCY MEDICINE

## 2025-06-16 PROCEDURE — 85027 COMPLETE CBC AUTOMATED: CPT | Performed by: EMERGENCY MEDICINE

## 2025-06-16 PROCEDURE — 96374 THER/PROPH/DIAG INJ IV PUSH: CPT

## 2025-06-16 PROCEDURE — 99285 EMERGENCY DEPT VISIT HI MDM: CPT | Mod: 25

## 2025-06-16 PROCEDURE — 93010 ELECTROCARDIOGRAM REPORT: CPT | Mod: ,,, | Performed by: INTERNAL MEDICINE

## 2025-06-16 PROCEDURE — 94640 AIRWAY INHALATION TREATMENT: CPT

## 2025-06-16 PROCEDURE — 84484 ASSAY OF TROPONIN QUANT: CPT | Performed by: EMERGENCY MEDICINE

## 2025-06-16 PROCEDURE — 63600175 PHARM REV CODE 636 W HCPCS: Mod: JZ,TB | Performed by: EMERGENCY MEDICINE

## 2025-06-16 RX ORDER — IPRATROPIUM BROMIDE AND ALBUTEROL SULFATE 2.5; .5 MG/3ML; MG/3ML
3 SOLUTION RESPIRATORY (INHALATION)
Status: COMPLETED | OUTPATIENT
Start: 2025-06-16 | End: 2025-06-16

## 2025-06-16 RX ORDER — METHYLPREDNISOLONE SOD SUCC 125 MG
125 VIAL (EA) INJECTION
Status: COMPLETED | OUTPATIENT
Start: 2025-06-16 | End: 2025-06-16

## 2025-06-16 RX ADMIN — SODIUM CHLORIDE 500 ML: 9 INJECTION, SOLUTION INTRAVENOUS at 10:06

## 2025-06-16 RX ADMIN — IPRATROPIUM BROMIDE AND ALBUTEROL SULFATE 3 ML: 2.5; .5 SOLUTION RESPIRATORY (INHALATION) at 10:06

## 2025-06-16 RX ADMIN — IPRATROPIUM BROMIDE AND ALBUTEROL SULFATE 3 ML: 2.5; .5 SOLUTION RESPIRATORY (INHALATION) at 11:06

## 2025-06-16 RX ADMIN — METHYLPREDNISOLONE SODIUM SUCCINATE 125 MG: 125 INJECTION, POWDER, FOR SOLUTION INTRAMUSCULAR; INTRAVENOUS at 10:06

## 2025-06-16 NOTE — Clinical Note
"Georgina Rodrigueztani Interiano was seen and treated in our emergency department on 6/16/2025.  She may return to work on 06/19/2025.       If you have any questions or concerns, please don't hesitate to call.      Anil Carson MD"

## 2025-06-17 VITALS
TEMPERATURE: 98 F | HEART RATE: 81 BPM | SYSTOLIC BLOOD PRESSURE: 117 MMHG | WEIGHT: 124.5 LBS | OXYGEN SATURATION: 95 % | DIASTOLIC BLOOD PRESSURE: 65 MMHG | RESPIRATION RATE: 15 BRPM | BODY MASS INDEX: 20.72 KG/M2

## 2025-06-17 PROCEDURE — 25000003 PHARM REV CODE 250: Performed by: EMERGENCY MEDICINE

## 2025-06-17 PROCEDURE — 25000242 PHARM REV CODE 250 ALT 637 W/ HCPCS: Performed by: EMERGENCY MEDICINE

## 2025-06-17 PROCEDURE — 63600175 PHARM REV CODE 636 W HCPCS: Mod: JZ,TB | Performed by: EMERGENCY MEDICINE

## 2025-06-17 PROCEDURE — 94640 AIRWAY INHALATION TREATMENT: CPT | Mod: XB

## 2025-06-17 RX ORDER — KETOROLAC TROMETHAMINE 30 MG/ML
15 INJECTION, SOLUTION INTRAMUSCULAR; INTRAVENOUS
Status: DISCONTINUED | OUTPATIENT
Start: 2025-06-17 | End: 2025-06-17 | Stop reason: HOSPADM

## 2025-06-17 RX ORDER — ACETAMINOPHEN 500 MG
1000 TABLET ORAL
Status: COMPLETED | OUTPATIENT
Start: 2025-06-17 | End: 2025-06-17

## 2025-06-17 RX ORDER — PREDNISONE 50 MG/1
50 TABLET ORAL DAILY
Qty: 10 TABLET | Refills: 0 | Status: SHIPPED | OUTPATIENT
Start: 2025-06-17

## 2025-06-17 RX ORDER — IPRATROPIUM BROMIDE AND ALBUTEROL SULFATE 2.5; .5 MG/3ML; MG/3ML
3 SOLUTION RESPIRATORY (INHALATION)
Status: COMPLETED | OUTPATIENT
Start: 2025-06-17 | End: 2025-06-17

## 2025-06-17 RX ORDER — AZITHROMYCIN 250 MG/1
250 TABLET, FILM COATED ORAL DAILY
Qty: 6 TABLET | Refills: 0 | Status: SHIPPED | OUTPATIENT
Start: 2025-06-17

## 2025-06-17 RX ADMIN — IPRATROPIUM BROMIDE AND ALBUTEROL SULFATE 3 ML: 2.5; .5 SOLUTION RESPIRATORY (INHALATION) at 02:06

## 2025-06-17 RX ADMIN — ACETAMINOPHEN 1000 MG: 500 TABLET ORAL at 01:06

## 2025-06-17 NOTE — ED PROVIDER NOTES
SCRIBE #1 NOTE: I, Nya Posey, am scribing for, and in the presence of, Anil Carson MD. I have scribed the entire note.       History     Chief Complaint   Patient presents with    Shortness of Breath     Sob and cough not better with home breathing tx. Audible wheezing heard during triage      Review of patient's allergies indicates:  No Known Allergies      History of Present Illness     HPI    6/16/2025, 10:12 PM  History obtained from the patient      History of Present Illness: Georgina Interiano is a 29 y.o. female patient with a PMHx of asthma who presents to the Emergency Department for evaluation of SOB which onset gradually over the course of the day. Symptoms are constant and moderate in severity. No mitigating or exacerbating factors reported. Associated sxs include wheezing and a productive cough. Patient denies any fever, N/V/D, and all other sxs at this time. Prior Tx includes an at home breathing treatment with no relief. No further complaints or concerns at this time.       Arrival mode: Personal vehicle    PCP: Oksana Vo MD        Past Medical History:  Past Medical History:   Diagnosis Date    Asthma     Unspecified chronic bronchitis        Past Surgical History:  History reviewed. No pertinent surgical history.      Family History:  Family History   Problem Relation Name Age of Onset    Autoimmune disease Mother         Social History:  Social History     Tobacco Use    Smoking status: Never    Smokeless tobacco: Never   Substance and Sexual Activity    Alcohol use: No    Drug use: No    Sexual activity: Yes     Partners: Male        Review of Systems     Review of Systems   Constitutional:  Negative for fever.   Respiratory:  Positive for cough (productive), shortness of breath and wheezing.    Gastrointestinal:  Negative for diarrhea, nausea and vomiting.   All other systems reviewed and are negative.       Physical Exam     Initial Vitals [06/16/25 2204]   BP Pulse  Resp Temp SpO2   (!) 124/95 (!) 120 (!) 24 98.3 °F (36.8 °C) (!) 89 %      MAP       --          Physical Exam  Nursing Notes and Vital Signs Reviewed.  Constitutional: Patient is in no acute distress. Well-developed and well-nourished.  Head: Atraumatic. Normocephalic.  Eyes: PERRL. EOM intact. Conjunctivae are not pale. No scleral icterus.  ENT: Mucous membranes are moist. Oropharynx is clear and symmetric.   Neck: Supple. Full ROM. No lymphadenopathy.  Cardiovascular: Tachycardic. Regular rhythm. No murmurs, rubs, or gallops. Distal pulses are 2+ and symmetric.  Pulmonary/Chest: Severe respiratory distress. Severe retraction. Wheezing. Clear to auscultation bilaterally. No rales.  Abdominal: Soft and non-distended. There is no tenderness.  No rebound, guarding, or rigidity.   Musculoskeletal: Moves all extremities. No obvious deformities. No edema.  Skin: Warm and dry.  Neurological:  Alert, awake, and appropriate.  Normal speech.  No acute focal neurological deficits are appreciated.  Psychiatric: Normal affect. Good eye contact. Appropriate in content.     ED Course   Critical Care    Date/Time: 6/16/2025 10:29 PM    Performed by: Anil Carson MD  Authorized by: Anil Carson MD  Direct patient critical care time: 15 minutes  Additional history critical care time: 11 minutes  Ordering / reviewing critical care time: 8 minutes  Documentation critical care time: 6 minutes  Total critical care time (exclusive of procedural time) : 40 minutes  Critical care was necessary to treat or prevent imminent or life-threatening deterioration of the following conditions: severe respiratory distress.  Critical care was time spent personally by me on the following activities: blood draw for specimens, development of treatment plan with patient or surrogate, discussions with consultants, discussions with primary provider, examination of patient, obtaining history from patient or surrogate, interpretation of cardiac  output measurements, evaluation of patient's response to treatment, ordering and review of laboratory studies, ordering and review of radiographic studies, ordering and performing treatments and interventions, pulse oximetry, re-evaluation of patient's condition and review of old charts.        ED Vital Signs:  Vitals:    06/16/25 2204 06/16/25 2247 06/16/25 2300 06/16/25 2305   BP: (!) 124/95  114/66 114/66   Pulse: (!) 120 (!) 112 91 93   Resp: (!) 24 (!) 27 16 20   Temp: 98.3 °F (36.8 °C)      TempSrc: Oral      SpO2: (!) 89% 100% 100% 100%   Weight: 56.5 kg (124 lb 8 oz)       06/16/25 2330 06/16/25 2345 06/17/25 0100 06/17/25 0130   BP: 111/62 111/65 106/61    Pulse: 99 83 85 84   Resp: 17 20 17    Temp:       TempSrc:       SpO2: 100% 97% 98% 98%   Weight:        06/17/25 0202   BP: 111/67   Pulse: 86   Resp: 20   Temp:    TempSrc:    SpO2: 96%   Weight:        Abnormal Lab Results:  Labs Reviewed   COMPREHENSIVE METABOLIC PANEL - Abnormal       Result Value    Sodium 139      Potassium 3.7      Chloride 107      CO2 20 (*)     Glucose 101      BUN 8      Creatinine 0.8      Calcium 9.6      Protein Total 8.9 (*)     Albumin 4.2      Bilirubin Total 0.5      ALP 79      AST 18      ALT 12      Anion Gap 12      eGFR >60     CBC WITH DIFFERENTIAL - Abnormal    WBC 16.70 (*)     RBC 4.87      HGB 15.4      HCT 45.9      MCV 94      MCH 31.6 (*)     MCHC 33.6      RDW 13.0      Platelet Count 430      MPV 9.9      Nucleated RBC 0     MANUAL DIFFERENTIAL - Abnormal    Gran # (ANC) 8.7      Segmented Neutrophil % 51.0      Bands % 1.0      Lymphocyte % 26.0      Monocyte % 5.0      Eosinophil % 16.0 (*)     Basophil % 1.0      Platelet Estimate Appears Normal      Large/Giant Platelets Present     TROPONIN I - Normal    Troponin-I <0.006     B-TYPE NATRIURETIC PEPTIDE - Normal    BNP <10     CBC W/ AUTO DIFFERENTIAL    Narrative:     The following orders were created for panel order CBC auto  differential.  Procedure                               Abnormality         Status                     ---------                               -----------         ------                     CBC with Differential[[id number]]       Abnormal            Final result               Manual Differential[[id number]]         Abnormal            Final result                 Please view results for these tests on the individual orders.        All Lab Results:  Results for orders placed or performed during the hospital encounter of 06/16/25   Comprehensive metabolic panel    Collection Time: 06/16/25 10:36 PM   Result Value Ref Range    Sodium 139 136 - 145 mmol/L    Potassium 3.7 3.5 - 5.1 mmol/L    Chloride 107 95 - 110 mmol/L    CO2 20 (L) 23 - 29 mmol/L    Glucose 101 70 - 110 mg/dL    BUN 8 6 - 20 mg/dL    Creatinine 0.8 0.5 - 1.4 mg/dL    Calcium 9.6 8.7 - 10.5 mg/dL    Protein Total 8.9 (H) 6.0 - 8.4 gm/dL    Albumin 4.2 3.5 - 5.2 g/dL    Bilirubin Total 0.5 0.1 - 1.0 mg/dL    ALP 79 40 - 150 unit/L    AST 18 11 - 45 unit/L    ALT 12 10 - 44 unit/L    Anion Gap 12 8 - 16 mmol/L    eGFR >60 >60 mL/min/1.73/m2   Troponin I #1    Collection Time: 06/16/25 10:36 PM   Result Value Ref Range    Troponin-I <0.006 <=0.026 ng/mL   BNP    Collection Time: 06/16/25 10:36 PM   Result Value Ref Range    BNP <10 0 - 99 pg/mL   CBC with Differential    Collection Time: 06/16/25 10:36 PM   Result Value Ref Range    WBC 16.70 (H) 3.90 - 12.70 K/uL    RBC 4.87 4.00 - 5.40 M/uL    HGB 15.4 12.0 - 16.0 gm/dL    HCT 45.9 37.0 - 48.5 %    MCV 94 82 - 98 fL    MCH 31.6 (H) 27.0 - 31.0 pg    MCHC 33.6 32.0 - 36.0 g/dL    RDW 13.0 11.5 - 14.5 %    Platelet Count 430 150 - 450 K/uL    MPV 9.9 9.2 - 12.9 fL    Nucleated RBC 0 <=0 /100 WBC   Manual Differential    Collection Time: 06/16/25 10:36 PM   Result Value Ref Range    Gran # (ANC) 8.7 K/uL    Segmented Neutrophil % 51.0 38.0 - 73.0 %    Bands % 1.0 %    Lymphocyte % 26.0 18.0 - 48.0 %     Monocyte % 5.0 4.0 - 15.0 %    Eosinophil % 16.0 (H) 0.0 - 8.0 %    Basophil % 1.0 <=1.9 %    Platelet Estimate Appears Normal      Large/Giant Platelets Present          Imaging Results:  Imaging Results              X-Ray Chest AP Portable (Final result)  Result time 06/16/25 22:46:41      Final result by Reymundo Steinberg Jr., MD (06/16/25 22:46:41)                   Impression:      No acute chest findings.    Finalized on: 6/16/2025 10:46 PM By:  Reymundo Steinberg MD  [facility]# [id number]      2025-06-16 22:48:42.314     [facility]               Narrative:    EXAM:  XR CHEST AP PORTABLE    CLINICAL HISTORY:   Chest pain    FINDINGS: The lungs are clear.  The cardiac silhouette and mediastinum are within normal limits.  Osseous structures and soft tissues are within normal limits.                                         The EKG was ordered, reviewed, and independently interpreted by the ED provider.  Interpretation time: 22:08  Rate: 115 BPM  Rhythm: sinus tachycardia  Interpretation: rightward axis. Right atrial enlargement. No STEMI.         The Emergency Provider reviewed the vital signs and test results, which are outlined above.     ED Discussion       11:15 PM: Pt is talking in full sentences with no retraction at this time. Opt is reporting of a headache.    2:03 AM: Reassessed pt at this time. Pt is feeling better at this time. Pt is maintaining O2 saturation at above 95%.  No retractions and able to talk in full sentences she works at [employer] in his drive and she does feel like the pollen in the summer times causes her to wheeze a lot.  She plans to as her boss to move her to inside the where house.  Discussed with patient and/or family/caretaker all pertinent ED information and results. Discussed pt dx and plan of tx. Gave patient all f/u and return to the ED instructions. All questions and concerns were addressed at this time. Patient and/or family/caretaker expresses understanding of information and instructions,  and is comfortable with plan to discharge. Pt is stable for discharge.    I discussed with patient and/or family/caretaker that evaluation in the ED does not suggest any emergent or life threatening medical conditions requiring immediate intervention beyond what was provided in the ED, and I believe patient is safe for discharge.  Regardless, an unremarkable evaluation in the ED does not preclude the development or presence of a serious of life threatening condition. As such, it was instructed that the patient return immediately for any worsening or change in current symptoms.         Medical Decision Making  Differential diagnoses:  Pneumonia, Congestive heart failure, Acute Myocardial infarction, Pleural effusion, Pulmonary edema, Pulmonary embolism, Electrolyte imbalance, Infectious etiology, COPD exacerbation, Asthma exacerbation, Pneumothorax,  Cardiac tamponade, Anemia, Deconditioning, bronchospasm, upper airway obstruction such: Aspiration, Foreign body      Amount and/or Complexity of Data Reviewed  Labs: ordered. Decision-making details documented in ED Course.  Radiology: ordered. Decision-making details documented in ED Course.  ECG/medicine tests: ordered and independent interpretation performed. Decision-making details documented in ED Course.    Risk  OTC drugs.  Prescription drug management.    Critical Care  Total time providing critical care: 40 minutes                ED Medication(s):  Medications   ketorolac injection 15 mg (15 mg Intravenous Not Given 6/17/25 0147)   albuterol-ipratropium 2.5 mg-0.5 mg/3 mL nebulizer solution 3 mL (has no administration in time range)   albuterol-ipratropium 2.5 mg-0.5 mg/3 mL nebulizer solution 3 mL (3 mLs Nebulization Given 6/16/25 2305)   methylPREDNISolone sodium succinate injection 125 mg (125 mg Intravenous Given 6/16/25 2234)   sodium chloride 0.9% bolus 500 mL 500 mL (0 mLs Intravenous Stopped 6/16/25 2355)   acetaminophen tablet 1,000 mg (1,000 mg Oral  Given 6/17/25 0147)       New Prescriptions    AZITHROMYCIN (Z-WANDA) 250 MG TABLET    Take 1 tablet (250 mg total) by mouth once daily. Take first 2 tablets together, then 1 every day until finished.    PREDNISONE (DELTASONE) 50 MG TAB    Take 1 tablet (50 mg total) by mouth once daily.        Follow-up Information       Oksana Vo MD. Schedule an appointment as soon as possible for a visit in 2 days.    Specialty: Internal Medicine  Contact information:  [address] [address]  [phone]                                 Scribe Attestation:   Scribe #1: I performed the above scribed service and the documentation accurately describes the services I performed. I attest to the accuracy of the note.     Attending:   Physician Attestation Statement for Scribe #1: I, Anil Carson MD, personally performed the services described in this documentation, as scribed by Nya Posey, in my presence, and it is both accurate and complete.           Clinical Impression       ICD-10-CM ICD-9-CM   1. Severe asthma with acute exacerbation, unspecified whether persistent  J45.901 493.92   2. Chest pain  R07.9 786.50       Disposition:   Disposition: Discharged  Condition: Stable         Anil Carson MD  06/18/25 0206

## 2025-06-17 NOTE — ED NOTES
Called pharmacy at this time and spoke with Rodríguez to inform him randyxis is not loaded with Duo-neb solution. States he will deliver more to ED ASAP

## 2025-06-17 NOTE — DISCHARGE INSTRUCTIONS
Prednisone has been prescribed to help you with your asthma  Zithromax antibiotic has been prescribed    Return to emergency department if you develop:  - Sustained Fever >100.4 or low temperature <96.8   - Tachycardia (very high heart rate) or Pulse >90  - Hypotension (low blood pressure) a top number (systolic pressure) of <90 or a bottom number (diastolic pressure) of <40 or lower  - Hypertension (high blood pressure) a top number (systolic pressure) of >180 or a bottom number (diastolic pressure) of >120 or higher  - Severe pain not relieved with recommended pain control regimen  - Severe shortness of breath above baseline  - Severe nausea, vomiting, inability to tolerate oral feeding/hydration  - Altered mental status, abnormal interaction or behaviors   - If symptoms acutely worsen or do not improve  - Development of other worrisome symptom

## 2025-06-18 ENCOUNTER — HOSPITAL ENCOUNTER (EMERGENCY)
Facility: HOSPITAL | Age: 30
Discharge: HOME | End: 2025-06-18
Attending: EMERGENCY MEDICINE
Payer: COMMERCIAL

## 2025-06-18 VITALS
RESPIRATION RATE: 20 BRPM | TEMPERATURE: 97 F | DIASTOLIC BLOOD PRESSURE: 63 MMHG | SYSTOLIC BLOOD PRESSURE: 100 MMHG | OXYGEN SATURATION: 95 % | HEART RATE: 85 BPM | WEIGHT: 122.19 LBS | BODY MASS INDEX: 20.34 KG/M2

## 2025-06-18 DIAGNOSIS — J82.83 EOSINOPHILIC ASTHMA: ICD-10-CM

## 2025-06-18 DIAGNOSIS — J45.50 STEROID-DEPENDENT ASTHMA, SEVERE PERSISTENT, UNCOMPLICATED: ICD-10-CM

## 2025-06-18 DIAGNOSIS — J45.50 SEVERE PERSISTENT ASTHMA WITHOUT COMPLICATION: ICD-10-CM

## 2025-06-18 DIAGNOSIS — J45.41 MODERATE PERSISTENT ASTHMA WITH ACUTE EXACERBATION: Primary | ICD-10-CM

## 2025-06-18 DIAGNOSIS — R07.9 CHEST PAIN: ICD-10-CM

## 2025-06-18 DIAGNOSIS — J98.01 BRONCHOSPASM: ICD-10-CM

## 2025-06-18 LAB
ABSOLUTE EOSINOPHIL (OHS): 0.18 K/UL
ABSOLUTE MONOCYTE (OHS): 1.11 K/UL (ref 0.3–1)
ABSOLUTE NEUTROPHIL COUNT (OHS): 9.67 K/UL (ref 1.8–7.7)
ALBUMIN SERPL BCP-MCNC: 3.8 G/DL (ref 3.5–5.2)
ALP SERPL-CCNC: 61 UNIT/L (ref 40–150)
ALT SERPL W/O P-5'-P-CCNC: 15 UNIT/L (ref 10–44)
ANION GAP (OHS): 9 MMOL/L (ref 8–16)
AST SERPL-CCNC: 18 UNIT/L (ref 11–45)
B-HCG UR QL: NEGATIVE
BASOPHILS # BLD AUTO: 0.03 K/UL
BASOPHILS NFR BLD AUTO: 0.2 %
BILIRUB SERPL-MCNC: 0.6 MG/DL (ref 0.1–1)
BUN SERPL-MCNC: 8 MG/DL (ref 6–20)
CALCIUM SERPL-MCNC: 9.4 MG/DL (ref 8.7–10.5)
CHLORIDE SERPL-SCNC: 109 MMOL/L (ref 95–110)
CO2 SERPL-SCNC: 23 MMOL/L (ref 23–29)
CREAT SERPL-MCNC: 0.8 MG/DL (ref 0.5–1.4)
ERYTHROCYTE [DISTWIDTH] IN BLOOD BY AUTOMATED COUNT: 13.2 % (ref 11.5–14.5)
GFR SERPLBLD CREATININE-BSD FMLA CKD-EPI: >60 ML/MIN/1.73/M2
GLUCOSE SERPL-MCNC: 104 MG/DL (ref 70–110)
HCT VFR BLD AUTO: 42.8 % (ref 37–48.5)
HGB BLD-MCNC: 14.5 GM/DL (ref 12–16)
IMM GRANULOCYTES # BLD AUTO: 0.05 K/UL (ref 0–0.04)
IMM GRANULOCYTES NFR BLD AUTO: 0.4 % (ref 0–0.5)
INFLUENZA A MOLECULAR (OHS): NEGATIVE
INFLUENZA B MOLECULAR (OHS): NEGATIVE
LYMPHOCYTES # BLD AUTO: 2.4 K/UL (ref 1–4.8)
MCH RBC QN AUTO: 31.8 PG (ref 27–31)
MCHC RBC AUTO-ENTMCNC: 33.9 G/DL (ref 32–36)
MCV RBC AUTO: 94 FL (ref 82–98)
NUCLEATED RBC (/100WBC) (OHS): 0 /100 WBC
OHS QRS DURATION: 70 MS
OHS QTC CALCULATION: 417 MS
PLATELET # BLD AUTO: 387 K/UL (ref 150–450)
PMV BLD AUTO: 9.9 FL (ref 9.2–12.9)
POTASSIUM SERPL-SCNC: 3.7 MMOL/L (ref 3.5–5.1)
PROT SERPL-MCNC: 7.8 GM/DL (ref 6–8.4)
RBC # BLD AUTO: 4.56 M/UL (ref 4–5.4)
RELATIVE EOSINOPHIL (OHS): 1.3 %
RELATIVE LYMPHOCYTE (OHS): 17.9 % (ref 18–48)
RELATIVE MONOCYTE (OHS): 8.3 % (ref 4–15)
RELATIVE NEUTROPHIL (OHS): 71.9 % (ref 38–73)
SARS-COV-2 RDRP RESP QL NAA+PROBE: NEGATIVE
SODIUM SERPL-SCNC: 141 MMOL/L (ref 136–145)
WBC # BLD AUTO: 13.44 K/UL (ref 3.9–12.7)

## 2025-06-18 PROCEDURE — 63600175 PHARM REV CODE 636 W HCPCS: Performed by: EMERGENCY MEDICINE

## 2025-06-18 PROCEDURE — 25000242 PHARM REV CODE 250 ALT 637 W/ HCPCS: Performed by: NURSE PRACTITIONER

## 2025-06-18 PROCEDURE — 96365 THER/PROPH/DIAG IV INF INIT: CPT

## 2025-06-18 PROCEDURE — 81025 URINE PREGNANCY TEST: CPT | Performed by: NURSE PRACTITIONER

## 2025-06-18 PROCEDURE — 94640 AIRWAY INHALATION TREATMENT: CPT | Mod: XB

## 2025-06-18 PROCEDURE — U0002 COVID-19 LAB TEST NON-CDC: HCPCS | Performed by: EMERGENCY MEDICINE

## 2025-06-18 PROCEDURE — 80053 COMPREHEN METABOLIC PANEL: CPT | Performed by: NURSE PRACTITIONER

## 2025-06-18 PROCEDURE — 99285 EMERGENCY DEPT VISIT HI MDM: CPT | Mod: 25

## 2025-06-18 PROCEDURE — 87502 INFLUENZA DNA AMP PROBE: CPT | Performed by: EMERGENCY MEDICINE

## 2025-06-18 PROCEDURE — 93005 ELECTROCARDIOGRAM TRACING: CPT

## 2025-06-18 PROCEDURE — 93010 ELECTROCARDIOGRAM REPORT: CPT | Mod: ,,, | Performed by: INTERNAL MEDICINE

## 2025-06-18 PROCEDURE — 94761 N-INVAS EAR/PLS OXIMETRY MLT: CPT

## 2025-06-18 PROCEDURE — 85025 COMPLETE CBC W/AUTO DIFF WBC: CPT | Performed by: NURSE PRACTITIONER

## 2025-06-18 PROCEDURE — 96366 THER/PROPH/DIAG IV INF ADDON: CPT

## 2025-06-18 PROCEDURE — 96375 TX/PRO/DX INJ NEW DRUG ADDON: CPT

## 2025-06-18 PROCEDURE — 25000242 PHARM REV CODE 250 ALT 637 W/ HCPCS: Performed by: EMERGENCY MEDICINE

## 2025-06-18 RX ORDER — IPRATROPIUM BROMIDE AND ALBUTEROL SULFATE 2.5; .5 MG/3ML; MG/3ML
3 SOLUTION RESPIRATORY (INHALATION)
Status: COMPLETED | OUTPATIENT
Start: 2025-06-18 | End: 2025-06-18

## 2025-06-18 RX ORDER — METHYLPREDNISOLONE SOD SUCC 125 MG
125 VIAL (EA) INJECTION
Status: COMPLETED | OUTPATIENT
Start: 2025-06-18 | End: 2025-06-18

## 2025-06-18 RX ORDER — MAGNESIUM SULFATE HEPTAHYDRATE 40 MG/ML
2 INJECTION, SOLUTION INTRAVENOUS ONCE
Status: COMPLETED | OUTPATIENT
Start: 2025-06-18 | End: 2025-06-18

## 2025-06-18 RX ORDER — IPRATROPIUM BROMIDE AND ALBUTEROL SULFATE 2.5; .5 MG/3ML; MG/3ML
3 SOLUTION RESPIRATORY (INHALATION) EVERY 4 HOURS PRN
Qty: 120 EACH | Refills: 3 | Status: SHIPPED | OUTPATIENT
Start: 2025-06-18 | End: 2026-06-18

## 2025-06-18 RX ORDER — MAGNESIUM SULFATE HEPTAHYDRATE 40 MG/ML
2 INJECTION, SOLUTION INTRAVENOUS ONCE
Status: DISCONTINUED | OUTPATIENT
Start: 2025-06-18 | End: 2025-06-18

## 2025-06-18 RX ORDER — PREDNISONE 20 MG/1
60 TABLET ORAL
Status: COMPLETED | OUTPATIENT
Start: 2025-06-18 | End: 2025-06-18

## 2025-06-18 RX ORDER — IPRATROPIUM BROMIDE AND ALBUTEROL SULFATE 2.5; .5 MG/3ML; MG/3ML
3 SOLUTION RESPIRATORY (INHALATION) ONCE
Status: COMPLETED | OUTPATIENT
Start: 2025-06-18 | End: 2025-06-18

## 2025-06-18 RX ADMIN — IPRATROPIUM BROMIDE AND ALBUTEROL SULFATE 3 ML: 2.5; .5 SOLUTION RESPIRATORY (INHALATION) at 09:06

## 2025-06-18 RX ADMIN — MAGNESIUM SULFATE HEPTAHYDRATE 2 G: 40 INJECTION, SOLUTION INTRAVENOUS at 09:06

## 2025-06-18 RX ADMIN — IPRATROPIUM BROMIDE AND ALBUTEROL SULFATE 3 ML: 2.5; .5 SOLUTION RESPIRATORY (INHALATION) at 11:06

## 2025-06-18 RX ADMIN — PREDNISONE 60 MG: 20 TABLET ORAL at 12:06

## 2025-06-18 RX ADMIN — METHYLPREDNISOLONE SODIUM SUCCINATE 125 MG: 125 INJECTION, POWDER, FOR SOLUTION INTRAMUSCULAR; INTRAVENOUS at 09:06

## 2025-06-18 NOTE — TELEPHONE ENCOUNTER
Upon confirming Friday appt. With the patient. She states she need a refill on her Trelgey inhaler. The pharmacy informed her she will need a new Rx: She would like a return call. Sent message to Dr. Del Valle Staff.

## 2025-06-18 NOTE — FIRST PROVIDER EVALUATION
Called patient, no answer. Left voicemail to call back     Medical screening examination initiated.  I have conducted a focused provider triage encounter, findings are as follows:    Brief history of present illness:  29-year-old female presents emergency department shortness of breath along with chest pain.  Reports history of asthma.  Patient reports she was recently seen here.  Patient reports she was discharge.  Patient reports no improvement in symptoms.    Vitals:    06/18/25 0845 06/18/25 0847 06/18/25 0848   BP:  125/62    Pulse:  100    Resp:  (!) 24    Temp:   97.1 °F (36.2 °C)   TempSrc:   Oral   SpO2:  (!) 93%    Weight: 55.4 kg (122 lb 3.2 oz)         Pertinent physical exam:  Wheezing    Brief workup plan:  Labs, EKG, imaging, further eval    Preliminary workup initiated; this workup will be continued and followed by the physician or advanced practice provider that is assigned to the patient when roomed.

## 2025-06-18 NOTE — Clinical Note
"Georgina Rodrigueztani Interiano was seen and treated in our emergency department on 6/18/2025.  She may return to work on 06/23/2025.       If you have any questions or concerns, please don't hesitate to call.      Nic Hickman Jr., MD"

## 2025-06-18 NOTE — DISCHARGE INSTRUCTIONS
You were given first dose of oral steroids here today     Fill prescriptions written for you yesterday prednisone and use nebulizer treatments as directed for asthma     Return here as needed

## 2025-06-18 NOTE — ED PROVIDER NOTES
SCRIBE #1 NOTE: I, Michael Loomis and Pooja Roque, am scribing for, and in the presence of, Nic Hickman Jr., MD. I have scribed the entire note.       History     Chief Complaint   Patient presents with    Shortness of Breath     SOB and chest discomfort with inhalation. No relief with inhaler. Hx asthma +wheezing in triage      Review of patient's allergies indicates:  No Known Allergies      History of Present Illness     HPI    6/18/2025, 9:30 AM  History obtained from the patient and medical records      History of Present Illness: Georgina Interiano is a 29 y.o. female patient with a PMHx of asthma who presents to the Emergency Department for evaluation of SOB which began early this morning. Pt was here Monday night (2 days ago) and was discharged yesterday at 3 AM. From this visit, she was prescribed Z-phillip and prednisone, but hasn't picked them up from the pharmacy yet. Her last breathing tx was today at 1:43 AM today. Symptoms are constant and moderate in severity. No mitigating or exacerbating factors reported. Associated sxs include wheezing. Patient denies any fever or chills. No further complaints or concerns at this time.       Arrival mode: Personal Transportation    PCP: Oksana Vo MD        Past Medical History:  Past Medical History:   Diagnosis Date    Asthma     Unspecified chronic bronchitis        Past Surgical History:  No past surgical history on file.      Family History:  Family History   Problem Relation Name Age of Onset    Autoimmune disease Mother         Social History:  Social History     Tobacco Use    Smoking status: Never    Smokeless tobacco: Never   Substance and Sexual Activity    Alcohol use: No    Drug use: No    Sexual activity: Yes     Partners: Male        Review of Systems     Review of Systems   Constitutional:  Negative for chills and fever.   HENT:  Negative for sore throat.    Respiratory:  Positive for shortness of breath and wheezing.     Cardiovascular:  Negative for chest pain.   Gastrointestinal:  Negative for nausea.   Genitourinary:  Negative for dysuria.   Musculoskeletal:  Negative for back pain.   Skin:  Negative for rash.   Neurological:  Negative for weakness.   Hematological:  Does not bruise/bleed easily.      Physical Exam     Initial Vitals   BP Pulse Resp Temp SpO2   06/18/25 0847 06/18/25 0847 06/18/25 0847 06/18/25 0848 06/18/25 0847   125/62 100 (!) 24 97.1 °F (36.2 °C) (!) 93 %      MAP       --                 Physical Exam  Nursing Notes and Vital Signs Reviewed.  Constitutional: Patient is in no acute distress. Well-developed and well-nourished.  Head: Atraumatic. Normocephalic.  Eyes: PERRL. EOM intact. Conjunctivae are not pale. No scleral icterus.  ENT: Mucous membranes are moist. Oropharynx is clear and symmetric.    Neck: Supple. Full ROM. No lymphadenopathy.  Cardiovascular: Regular rate. Regular rhythm. No murmurs, rubs, or gallops. Distal pulses are 2+ and symmetric.  Pulmonary/Chest: Wheezing to bilateral lung bases.  Slight tachypneia with productive cough.  Abdominal: Soft and non-distended. There is no tenderness.  No rebound, guarding, or rigidity. Good bowel sounds.  Genitourinary: No CVA tenderness.  Musculoskeletal: Moves all extremities. No obvious deformities. No edema. No calf tenderness.  Skin: Warm and dry.  Neurological:  Alert, awake, and appropriate.  Normal speech.  No acute focal neurological deficits are appreciated.  Psychiatric: Normal affect. Good eye contact. Appropriate in content.     ED Course   Procedures  ED Vital Signs:  Vitals:    06/18/25 0845 06/18/25 0847 06/18/25 0848 06/18/25 0920   BP:  125/62     Pulse:  100  85   Resp:  (!) 24  20   Temp:   97.1 °F (36.2 °C)    TempSrc:   Oral    SpO2:  (!) 93%  96%   Weight: 55.4 kg (122 lb 3.2 oz)       06/18/25 0925 06/18/25 0929 06/18/25 0930 06/18/25 0931   BP:   (!) 105/59    Pulse: 85 85 92 92   Resp: 20 20 18    Temp:   97.1 °F (36.2 °C)     TempSrc:       SpO2: 96% 96% (!) 94%    Weight:           Abnormal Lab Results:  Labs Reviewed   CBC WITH DIFFERENTIAL - Abnormal       Result Value    WBC 13.44 (*)     RBC 4.56      HGB 14.5      HCT 42.8      MCV 94      MCH 31.8 (*)     MCHC 33.9      RDW 13.2      Platelet Count 387      MPV 9.9      Nucleated RBC 0      Neut % 71.9      Lymph % 17.9 (*)     Mono % 8.3      Eos % 1.3      Basophil % 0.2      Imm Grans % 0.4      Neut # 9.67 (*)     Lymph # 2.40      Mono # 1.11 (*)     Eos # 0.18      Baso # 0.03      Imm Grans # 0.05 (*)    INFLUENZA A & B BY MOLECULAR - Normal    INFLUENZA A MOLECULAR Negative      INFLUENZA B MOLECULAR  Negative     COMPREHENSIVE METABOLIC PANEL - Normal    Sodium 141      Potassium 3.7      Chloride 109      CO2 23      Glucose 104      BUN 8      Creatinine 0.8      Calcium 9.4      Protein Total 7.8      Albumin 3.8      Bilirubin Total 0.6      ALP 61      AST 18      ALT 15      Anion Gap 9      eGFR >60     PREGNANCY TEST, URINE RAPID - Normal    hCG Qualitative, Urine Negative     SARS-COV-2 RNA AMPLIFICATION, QUAL - Normal    SARS COV-2 Molecular Negative     CBC W/ AUTO DIFFERENTIAL    Narrative:     The following orders were created for panel order CBC auto differential.  Procedure                               Abnormality         Status                     ---------                               -----------         ------                     CBC with Differential[[id number]]       Abnormal            Final result                 Please view results for these tests on the individual orders.        All Lab Results:  Results for orders placed or performed during the hospital encounter of 06/18/25   EKG 12-lead    Collection Time: 06/18/25  8:59 AM   Result Value Ref Range    QRS Duration 74 ms    [facility] QTC Calculation 413 ms   Influenza A & B by Molecular    Collection Time: 06/18/25  9:04 AM    Specimen: Nasal Swab   Result Value Ref Range    INFLUENZA A MOLECULAR  Negative Negative    INFLUENZA B MOLECULAR  Negative Negative   COVID-19 Rapid Screening    Collection Time: 06/18/25  9:04 AM   Result Value Ref Range    SARS COV-2 Molecular Negative Negative   Pregnancy, urine rapid (UPT)    Collection Time: 06/18/25  9:29 AM   Result Value Ref Range    hCG Qualitative, Urine Negative Negative   Comprehensive metabolic panel    Collection Time: 06/18/25  9:41 AM   Result Value Ref Range    Sodium 141 136 - 145 mmol/L    Potassium 3.7 3.5 - 5.1 mmol/L    Chloride 109 95 - 110 mmol/L    CO2 23 23 - 29 mmol/L    Glucose 104 70 - 110 mg/dL    BUN 8 6 - 20 mg/dL    Creatinine 0.8 0.5 - 1.4 mg/dL    Calcium 9.4 8.7 - 10.5 mg/dL    Protein Total 7.8 6.0 - 8.4 gm/dL    Albumin 3.8 3.5 - 5.2 g/dL    Bilirubin Total 0.6 0.1 - 1.0 mg/dL    ALP 61 40 - 150 unit/L    AST 18 11 - 45 unit/L    ALT 15 10 - 44 unit/L    Anion Gap 9 8 - 16 mmol/L    eGFR >60 >60 mL/min/1.73/m2   CBC with Differential    Collection Time: 06/18/25  9:41 AM   Result Value Ref Range    WBC 13.44 (H) 3.90 - 12.70 K/uL    RBC 4.56 4.00 - 5.40 M/uL    HGB 14.5 12.0 - 16.0 gm/dL    HCT 42.8 37.0 - 48.5 %    MCV 94 82 - 98 fL    MCH 31.8 (H) 27.0 - 31.0 pg    MCHC 33.9 32.0 - 36.0 g/dL    RDW 13.2 11.5 - 14.5 %    Platelet Count 387 150 - 450 K/uL    MPV 9.9 9.2 - 12.9 fL    Nucleated RBC 0 <=0 /100 WBC    Neut % 71.9 38 - 73 %    Lymph % 17.9 (L) 18 - 48 %    Mono % 8.3 4 - 15 %    Eos % 1.3 <=8 %    Basophil % 0.2 <=1.9 %    Imm Grans % 0.4 0.0 - 0.5 %    Neut # 9.67 (H) 1.8 - 7.7 K/uL    Lymph # 2.40 1 - 4.8 K/uL    Mono # 1.11 (H) 0.3 - 1 K/uL    Eos # 0.18 <=0.5 K/uL    Baso # 0.03 <=0.2 K/uL    Imm Grans # 0.05 (H) 0.00 - 0.04 K/uL       Imaging Results:  Imaging Results              X-Ray Chest AP Portable (Final result)  Result time 06/18/25 09:44:45      Final result by Keith Desir MD (06/18/25 09:44:45)                   Impression:      No acute abnormality.      Electronically signed by: Keith  Thang  Date:    06/18/2025  Time:    09:44               Narrative:    EXAMINATION:  XR CHEST AP PORTABLE    CLINICAL HISTORY:  Chest Pain;    TECHNIQUE:  Single frontal view of the chest was performed.    COMPARISON:  None    FINDINGS:  The lungs are clear, with normal appearance of pulmonary vasculature and no pleural effusion or pneumothorax.    The cardiac silhouette is normal in size. The hilar and mediastinal contours are unremarkable.    Bones are intact.                                       The EKG was ordered, reviewed, and independently interpreted by the ED provider.  Interpretation time: 8:59  Rate: 84 BPM  Rhythm: Sinus rhythm with sinus arrhythmia with short HI  Interpretation: Otherwise normal EKG. No STEMI.           The Emergency Provider reviewed the vital signs and test results, which are outlined above.     ED Discussion       11:26 AM: Reassessed pt at this time. Discussed with patient and/or family/caretaker all pertinent ED information and results. Discussed pt dx and plan of tx. Gave the patient all f/u and return to the ED instructions. All questions and concerns were addressed at this time. Patient and/or family/caretaker expresses understanding of information and instructions, and is comfortable with plan to discharge. Pt is stable for discharge.     I discussed with patient and/or family/caretaker that evaluation in the ED does not suggest any emergent or life threatening medical conditions requiring immediate intervention beyond what was provided in the ED, and I believe patient is safe for discharge. Regardless, an unremarkable evaluation in the ED does not preclude the development or presence of a serious or life threatening condition. As such, I instructed that the patient is to return immediately for any worsening or change in current symptoms.         Medical Decision Making  Differential diagnosis includes pneumonia, COPD exacerbation, asthma exacerbation, heart failure, anemia,  ACS, bronchitis, viral syndrome, influenza, covid, RSV, pneumothorax, pleural effusion, pulmonary embolism, mucus plugging, bronchiectasis, diaphragmatic paralysis, pneumoconiosis     Amount and/or Complexity of Data Reviewed  Labs: ordered. Decision-making details documented in ED Course.  Radiology: ordered. Decision-making details documented in ED Course.  ECG/medicine tests: ordered and independent interpretation performed. Decision-making details documented in ED Course.    Risk  Prescription drug management.                ED Medication(s):  Medications   magnesium sulfate 2g in water 50mL IVPB (premix) (2 g Intravenous New Bag 6/18/25 0949)   albuterol-ipratropium 2.5 mg-0.5 mg/3 mL nebulizer solution 3 mL (has no administration in time range)   predniSONE tablet 60 mg (has no administration in time range)   albuterol-ipratropium 2.5 mg-0.5 mg/3 mL nebulizer solution 3 mL (3 mLs Nebulization Given 6/18/25 0929)   methylPREDNISolone sodium succinate injection 125 mg (125 mg Intravenous Given by Other 6/18/25 0943)       New Prescriptions    No medications on file        Follow-up Information       Oksana Vo MD. Call today.    Specialty: Internal Medicine  Why: to schedule appt for recheck this week  Contact information:  [address] [address]  [phone]                                 Scribe Attestation:   Scribe #1: I performed the above scribed service and the documentation accurately describes the services I performed. I attest to the accuracy of the note.     Attending:   Physician Attestation Statement for Scribe #1: I, Nic Hickman Jr., MD, personally performed the services described in this documentation, as scribed by Michael Loomis and Pooja Roque, in my presence, and it is both accurate and complete.           Clinical Impression       ICD-10-CM ICD-9-CM   1. Moderate persistent asthma with acute exacerbation  J45.41 493.92   2. Chest pain  R07.9 786.50   3.  Bronchospasm  J98.01 519.11       Disposition:   Disposition: Discharged  Condition: Stable       Nic Hickman Jr., MD  06/18/25 9018

## 2025-06-19 LAB
OHS QRS DURATION: 74 MS
OHS QTC CALCULATION: 413 MS

## 2025-06-20 ENCOUNTER — CLINICAL SUPPORT (OUTPATIENT)
Dept: PULMONOLOGY | Facility: CLINIC | Age: 30
End: 2025-06-20
Payer: COMMERCIAL

## 2025-06-20 ENCOUNTER — OFFICE VISIT (OUTPATIENT)
Dept: PULMONOLOGY | Facility: CLINIC | Age: 30
End: 2025-06-20
Payer: COMMERCIAL

## 2025-06-20 VITALS
OXYGEN SATURATION: 96 % | DIASTOLIC BLOOD PRESSURE: 60 MMHG | HEART RATE: 77 BPM | SYSTOLIC BLOOD PRESSURE: 111 MMHG | HEIGHT: 65 IN | WEIGHT: 122.13 LBS | BODY MASS INDEX: 20.35 KG/M2

## 2025-06-20 VITALS — BODY MASS INDEX: 20.35 KG/M2 | WEIGHT: 122.13 LBS | HEIGHT: 65 IN

## 2025-06-20 DIAGNOSIS — Z78.9 FREQUENT HOSPITAL ADMISSIONS: ICD-10-CM

## 2025-06-20 DIAGNOSIS — J45.50 SEVERE PERSISTENT ASTHMA WITHOUT COMPLICATION: Primary | ICD-10-CM

## 2025-06-20 DIAGNOSIS — G47.19 EXCESSIVE DAYTIME SLEEPINESS: ICD-10-CM

## 2025-06-20 DIAGNOSIS — J45.51 SEVERE PERSISTENT ASTHMA WITH EXACERBATION: ICD-10-CM

## 2025-06-20 DIAGNOSIS — J31.0 CHRONIC RHINITIS: ICD-10-CM

## 2025-06-20 DIAGNOSIS — R06.83 SNORING: ICD-10-CM

## 2025-06-20 DIAGNOSIS — J82.83 EOSINOPHILIC ASTHMA: ICD-10-CM

## 2025-06-20 LAB
BRPFT: ABNORMAL
FEF 25 75 LLN: 1.86
FEF 25 75 PRE REF: 26.5 %
FEF 25 75 REF: 3.22
FEV1 FVC LLN: 74
FEV1 FVC PRE REF: 62.4 %
FEV1 FVC REF: 85
FEV1 LLN: 2.23
FEV1 PRE REF: 64.6 %
FEV1 REF: 2.86
FVC LLN: 2.64
FVC PRE REF: 103 %
FVC REF: 3.37
PEF LLN: 4.95
PEF PRE REF: 45.6 %
PEF REF: 7.02
PRE FEF 25 75: 0.85 L/S (ref 1.86–4.85)
PRE FET 100: 8.1 SEC
PRE FEV1 FVC: 53.2 % (ref 74.03–94.07)
PRE FEV1: 1.84 L (ref 2.23–3.46)
PRE FVC: 3.47 L (ref 2.64–4.13)
PRE PEF: 3.2 L/S (ref 4.95–9.08)

## 2025-06-20 PROCEDURE — 99999 PR PBB SHADOW E&M-EST. PATIENT-LVL IV: CPT | Mod: PBBFAC,,, | Performed by: INTERNAL MEDICINE

## 2025-06-20 PROCEDURE — 99999 PR PBB SHADOW E&M-EST. PATIENT-LVL I: CPT | Mod: PBBFAC,,,

## 2025-06-20 RX ORDER — MONTELUKAST SODIUM 10 MG/1
10 TABLET ORAL NIGHTLY
Qty: 90 TABLET | Refills: 3 | Status: SHIPPED | OUTPATIENT
Start: 2025-06-20 | End: 2026-06-20

## 2025-06-20 RX ORDER — FLUTICASONE FUROATE, UMECLIDINIUM BROMIDE AND VILANTEROL TRIFENATATE 200; 62.5; 25 UG/1; UG/1; UG/1
1 POWDER RESPIRATORY (INHALATION) DAILY
Qty: 60 EACH | Refills: 11 | Status: SHIPPED | OUTPATIENT
Start: 2025-06-20 | End: 2026-06-20

## 2025-06-20 RX ORDER — FLUTICASONE FUROATE, UMECLIDINIUM BROMIDE AND VILANTEROL TRIFENATATE 200; 62.5; 25 UG/1; UG/1; UG/1
1 POWDER RESPIRATORY (INHALATION) DAILY
Qty: 60 EACH | Refills: 11 | Status: SHIPPED | OUTPATIENT
Start: 2025-06-20 | End: 2025-06-20 | Stop reason: SDUPTHER

## 2025-06-20 NOTE — PROCEDURES
"[facility] - Pulmonary Function  Six Minute Walk     SUMMARY     Ordering Provider: Dr. Del Valle   Interpreting Provider: Dr. Del Valle  Performing nurse/tech/RT: KEL Fay RRT  Diagnosis: Asthma  Height: 5' 5" (165.1 cm)  Weight: 55.4 kg (122 lb 2.2 oz)  BMI (Calculated): 20.3                Phase Oxygen Assessment Supplemental O2 Heart   Rate Blood Pressure Kwabena Dyspnea Scale Rating   Resting 96 % Room Air 90 bpm 117/67 2   Exercise        Minute        1 98 % Room Air 100 bpm     2 96 % Room Air 102 bpm     3 97 % Room Air 102 bpm     4 96 % Room Air 100 bpm     5 96 % Room Air 98 bpm     6  97 % Room Air 101 bpm 111/56 4   Recovery        Minute        1 97 % Room Air 83 bpm     2 97 % Room Air 83 bpm     3 97 % Room Air 83 bpm     4 96 % Room Air 77 bpm 111/60 4     Six Minute Walk Summary  6MWT Status: completed without stopping  Patient Reported: Dyspnea     Interpretation:  Did the patient stop or pause?: No                                         Total Time Walked (Calculated): 360 seconds  Final Partial Lap Distance (feet): 0 feet  Total Distance Meters (Calculated): 365.76 meters  Predicted Distance Meters (Calculated): 715.1 meters  Percentage of Predicted (Calculated): 51.15  Peak VO2 (Calculated): 14.95  Mets: 4.27  Has The Patient Had a Previous Six Minute Walk Test?: No       Previous 6MWT Results  Has The Patient Had a Previous Six Minute Walk Test?: No    "

## 2025-06-20 NOTE — PROCEDURES
Clinical Guide to Interpretation or FeNO Levels :    FeNO  (ppb) LOW INTERMEDIATE HIGH   ADULT VALUES < 25 25-50          > 50   Th2-driven Inflammation Unlikely Likely Significant     Patients FeNO level at this visit : _24___ (ppb)    Interpretation of FeNO measurement in adults:  [x] FENO is less than 25 ppb implies non eosinophilic airway inflammation or the absence of airway inflammation.    Comment: Low FENO (<25 ppb) in adult asthmatics with persistent symptoms suggests other etiologies for these symptoms and a lower likelihood of benefit from adding or increasing inhaled glucocorticoids.    [] FENO between 25 and 50 ppb in adults should be interpreted cautiously with reference to the clinical situation (eg, symptomatic, on or off therapy, current smoking).    [] FENO greater than 50 ppb in adults  suggests eosinophilic airway inflammation   Comment: High FENO (>50 ppb) in adult asthmatics even with atypical symptoms suggests glucocorticoid responsiveness. High FENO (>50 ppb) can help identify poor adherence or uncontrolled inflammation in asthma patients with otherwise seemingly controlled asthma.    Discussion:  A FENO less than 25 ppb in adults and less than 20 ppb in children younger than 12 years of age implies noneosinophilic airway inflammation or the absence of airway inflammation.  A FENO greater than 50 ppb in adults or greater than 35 ppb in children suggests eosinophilic airway inflammation.   Values of FENO between 25 and 50 ppb in adults (20 to 35 ppb in children) should be interpreted cautiously with reference to the clinical situation (eg, symptomatic, on or off therapy, current smoking).  A rising FENO with a greater than 20 percent change and more than 25 ppb (20 ppb in children) from a previously stable level suggests increasing eosinophilic airway inflammation, but there are wide inter-individual differences.  A decrease in FENO greater than 20 percent for values over 50 ppb or more than  10 ppb for values less than 50 ppb may be clinically important.  ?FENO in other respiratory diseases - Several other diseases are associated with altered levels of exhaled NO: low levels of FENO have been noted in cystic fibrosis, current smoking, pulmonary hypertension, hypothermia, primary ciliary dyskinesia, and bronchopulmonary dysplasia. Elevated FENO has been noted in atopy, nonasthmatic eosinophilic bronchitis, COPD exacerbations, noncystic fibrosis bronchiectasis, and viral upper respiratory infections.    REFERENCE:  ATS Board of Directors, December 2004, and by the ERS Executive Committee, June 2004. ATS/ERS Recommendations for Standardized Procedures for the Online and Offline Measurement of Exhaled Lower Respiratory Nitric Oxide and Nasal Nitric Oxide. Guideline 2005

## 2025-06-20 NOTE — PROGRESS NOTES
"                                         Pulmonary Outpatient Follow Up Visit     Subjective:       Patient ID: Georgina Interiano is a 29 y.o. female.    Chief Complaint: Asthma        History of Present Illness    CHIEF COMPLAINT:  Patient presents today for follow up of asthma exacerbation requiring recent ER visits.    RECENT ACUTE CARE:  She had two emergency room visits this week (Monday night and Wednesday morning). She tested negative for COVID and influenza during these visits. She received Z-Mehdi and prednisone 50mg for 10 days.    MEDICATIONS:  She takes Trelegy and Montelukast daily and receives Dupixent injections. She currently only has Gabapentin solution from recent hospital discharge. She reports difficulty obtaining refills for Trelegy and Airsup due to new prior authorization requirements, which were not previously needed.    SLEEP:  She sleeps approximately 4 hours per night and reports snoring when extremely tired. She takes daytime naps and rates daytime sleepiness as 1.5/3. She reports feeling refreshed upon waking and denies breathing pauses during sleep.    ALLERGY HISTORY:  Prior allergy testing showed elevated IgE levels indicating general allergic tendency, however testing for environmental allergens including dander, cats, and dust was negative.         Ponce Sleepiness Scale score 12-16.  Snoring.  Fraction  nitric oxide today 24 parts per billion     FEV1 64%          Review of Systems   Constitutional:  Positive for fatigue.   Respiratory:  Positive for snoring, cough, dyspnea on extertion and somnolence.    Psychiatric/Behavioral:  Positive for sleep disturbance.        Encounter Medications[1]    Objective:     Vital Signs (Most Recent)  Vital Signs  Pulse: 77  SpO2: 96 %  BP: 111/60  Pain Score: 0-No pain  Height and Weight  Height: 5' 5" (165.1 cm)  Weight: 55.4 kg (122 lb 2.2 oz)  BSA (Calculated - sq m): 1.59 sq meters  BMI (Calculated): 20.3  Weight in (lb) to have " "BMI = 25: 149.9]  Wt Readings from Last 2 Encounters:   06/20/25 55.4 kg (122 lb 2.2 oz)   06/20/25 55.4 kg (122 lb 2.2 oz)       Physical Exam   Constitutional: She is oriented to person, place, and time. She appears well-developed. No distress.   Pulmonary/Chest: Effort normal. No stridor. No respiratory distress. She has no decreased breath sounds. She has no wheezes. She has no rhonchi.   Neurological: She is alert and oriented to person, place, and time.   Psychiatric: She has a normal mood and affect. Her behavior is normal. Judgment and thought content normal.   Nursing note and vitals reviewed.      Laboratory  Lab Results   Component Value Date    WBC 13.44 (H) 06/18/2025    RBC 4.56 06/18/2025    HGB 14.5 06/18/2025    HCT 42.8 06/18/2025    MCV 94 06/18/2025    MCH 31.8 (H) 06/18/2025    MCHC 33.9 06/18/2025    RDW 13.2 06/18/2025     06/18/2025    MPV 9.9 06/18/2025    GRAN 8.7 06/16/2025    LYMPH 17.9 (L) 06/18/2025    LYMPH 2.40 06/18/2025    MONO 8.3 06/18/2025    MONO 1.11 (H) 06/18/2025    EOS 1.3 06/18/2025    EOS 0.18 06/18/2025    BASO 0.02 02/28/2025    EOSINOPHIL 16.0 (H) 06/16/2025    BASOPHIL 0.2 06/18/2025    BASOPHIL 0.03 06/18/2025       BMP  Lab Results   Component Value Date     06/18/2025    K 3.7 06/18/2025     06/18/2025    CO2 23 06/18/2025    BUN 8 06/18/2025    CREATININE 0.8 06/18/2025    CALCIUM 9.4 06/18/2025    ANIONGAP 9 06/18/2025    ESTGFRAFRICA 117 04/01/2023    AST 18 06/18/2025    ALT 15 06/18/2025    PROT 7.8 06/18/2025       Lab Results   Component Value Date    BNP <10 06/16/2025    BNP 91 03/01/2022       Lab Results   Component Value Date    TSH 1.641 03/06/2025       No results found for: "SEDRATE"    No results found for: "CRP"  Lab Results   Component Value Date    .0 (H) 12/05/2024        Lab Results   Component Value Date    ASPERGILLUS <0.10 12/05/2024     Lab Results   Component Value Date    AFUMIGATUSCL CLASS 0 12/05/2024        No " "results found for: "ACE"     Diagnostic Results:  I have personally reviewed today the following studies:  As above    Assessment/Plan:   Severe persistent asthma without complication  -     montelukast (SINGULAIR) 10 mg tablet; Take 1 tablet (10 mg total) by mouth every evening.  Dispense: 90 tablet; Refill: 3  -     fluticasone-umeclidin-vilanter (TRELEGY ELLIPTA) 200-62.5-25 mcg inhaler; Inhale 1 puff into the lungs once daily.  Dispense: 60 each; Refill: 11  -     albuterol-budesonide (AIRSUPRA) 90-80 mcg/actuation; Inhale 2 puffs into the lungs every 4 (four) hours as needed (Shortness of Breath and/or wheezing).  Dispense: 10.7 g; Refill: 11  -     Ambulatory referral/consult to Pulmonary Disease Management w/ Respiratory Therapist; Future; Expected date: 06/27/2025    Frequent hospital admissions  -     Ambulatory referral/consult to Pulmonary Disease Management w/ Respiratory Therapist; Future; Expected date: 06/27/2025    Eosinophilic asthma  -     montelukast (SINGULAIR) 10 mg tablet; Take 1 tablet (10 mg total) by mouth every evening.  Dispense: 90 tablet; Refill: 3  -     fluticasone-umeclidin-vilanter (TRELEGY ELLIPTA) 200-62.5-25 mcg inhaler; Inhale 1 puff into the lungs once daily.  Dispense: 60 each; Refill: 11  -     albuterol-budesonide (AIRSUPRA) 90-80 mcg/actuation; Inhale 2 puffs into the lungs every 4 (four) hours as needed (Shortness of Breath and/or wheezing).  Dispense: 10.7 g; Refill: 11    Chronic rhinitis  -     montelukast (SINGULAIR) 10 mg tablet; Take 1 tablet (10 mg total) by mouth every evening.  Dispense: 90 tablet; Refill: 3    Snoring  -     Home Sleep Study; Future    Excessive daytime sleepiness  -     Home Sleep Study; Future      Assessment & Plan    J45.50 Severe persistent asthma without complication  J82.83 Eosinophilic asthma  J31.0 Chronic rhinitis  R06.83 Snoring  G47.19 Excessive daytime sleepiness    IMPRESSION:  - Asthma poorly controlled, with recent ER visits and " abnormal breathing test results (64% of predicted).  - Currently on maximum inhaler therapy with continued Trelegy, Air Supre, and Montelukast for asthma.  - Considered sleep apnea as potential contributing factor to asthma exacerbations due to observed crowded throat anatomy.  - Reviewed allergy testing results: elevated IgE but negative for specific environmental allergens, indicating general allergy/asthma tendency.  - Already on Dupixent for asthma management, prescribed by allergist based on elevated IgE.    SEVERE PERSISTENT ASTHMA WITHOUT COMPLICATION:  - Currently on maximum inhaler therapy with continued Trelegy, Air Supre, and Montelukast for asthma.  - Patient to proceed to emergency room if asthma symptoms become unstable.  - Explained potential link between sleep apnea and asthma exacerbations.  - Follow up in 2 months.  - Contact office if unable to get medications approved through insurance.    EOSINOPHILIC ASTHMA:  - Already on Dupixent for asthma management, prescribed by allergist based on elevated IgE.  - Contact office if unable to get medications approved through insurance.    SNORING:  - Discussed purpose of sleep study in diagnosing sleep apnea.  - Ordered home sleep study to evaluate for sleep apnea.    EXCESSIVE DAYTIME SLEEPINESS:  - Explained potential link between sleep apnea and asthma exacerbations.  - Discussed purpose of sleep study in diagnosing sleep apnea.  - Ordered home sleep study to evaluate for sleep apnea.         Follow up in about 2 months (around 8/20/2025).    This note was prepared using voice recognition system and is likely to have sound alike errors that may have been overlooked even after proof reading.  Please call me with any questions    Discussed diagnosis, its evaluation, treatment and usual course. All questions answered.      Brisa Del Valle MD         [1]   Outpatient Encounter Medications as of 6/20/2025   Medication Sig Dispense Refill     albuterol-ipratropium (DUO-NEB) 2.5 mg-0.5 mg/3 mL nebulizer solution Take 3 mLs by nebulization every 4 (four) hours as needed for Wheezing or Shortness of Breath. Rescue 120 each 3    azithromycin (Z-WANDA) 250 MG tablet Take 1 tablet (250 mg total) by mouth once daily. Take first 2 tablets together, then 1 every day until finished. 6 tablet 0    dupilumab 300 mg/2 mL PnIj Inject 2 mLs (300 mg total) into the skin every 14 (fourteen) days. 4 mL 11    predniSONE (DELTASONE) 50 MG Tab Take 1 tablet (50 mg total) by mouth once daily. 10 tablet 0    [DISCONTINUED] albuterol-budesonide (AIRSUPRA) 90-80 mcg/actuation Inhale 2 puffs into the lungs every 4 (four) hours as needed (Shortness of Breath and/or wheezing). 10.7 g 11    [DISCONTINUED] fluticasone-umeclidin-vilanter (TRELEGY ELLIPTA) 200-62.5-25 mcg inhaler Inhale 1 puff into the lungs once daily. 60 each 11    [DISCONTINUED] montelukast (SINGULAIR) 10 mg tablet Take 1 tablet (10 mg total) by mouth every evening. 90 tablet 3    albuterol-budesonide (AIRSUPRA) 90-80 mcg/actuation Inhale 2 puffs into the lungs every 4 (four) hours as needed (Shortness of Breath and/or wheezing). 10.7 g 11    fluticasone-umeclidin-vilanter (TRELEGY ELLIPTA) 200-62.5-25 mcg inhaler Inhale 1 puff into the lungs once daily. 60 each 11    montelukast (SINGULAIR) 10 mg tablet Take 1 tablet (10 mg total) by mouth every evening. 90 tablet 3    [DISCONTINUED] albuterol-ipratropium (DUO-NEB) 2.5 mg-0.5 mg/3 mL nebulizer solution Take 3 mLs by nebulization every 4 (four) hours as needed for Wheezing or Shortness of Breath. Rescue 120 each 3    [DISCONTINUED] fluticasone-umeclidin-vilanter (TRELEGY ELLIPTA) 200-62.5-25 mcg inhaler Inhale 1 puff into the lungs once daily. 60 each 11     No facility-administered encounter medications on file as of 6/20/2025.

## 2025-07-02 ENCOUNTER — HOSPITAL ENCOUNTER (OUTPATIENT)
Dept: SLEEP MEDICINE | Facility: HOSPITAL | Age: 30
Discharge: HOME | End: 2025-07-02
Attending: INTERNAL MEDICINE
Payer: COMMERCIAL

## 2025-07-02 DIAGNOSIS — G47.19 EXCESSIVE DAYTIME SLEEPINESS: ICD-10-CM

## 2025-07-02 DIAGNOSIS — R06.83 SNORING: ICD-10-CM

## 2025-07-02 PROCEDURE — 95806 SLEEP STUDY UNATT&RESP EFFT: CPT

## 2025-07-09 ENCOUNTER — PATIENT MESSAGE (OUTPATIENT)
Dept: ALLERGY | Facility: CLINIC | Age: 30
End: 2025-07-09
Payer: COMMERCIAL

## 2025-07-09 PROCEDURE — 95800 SLP STDY UNATTENDED: CPT | Mod: 26,52,, | Performed by: INTERNAL MEDICINE

## 2025-07-10 ENCOUNTER — PATIENT MESSAGE (OUTPATIENT)
Dept: PULMONOLOGY | Facility: CLINIC | Age: 30
End: 2025-07-10
Payer: COMMERCIAL

## 2025-07-10 DIAGNOSIS — J45.50 SEVERE PERSISTENT ASTHMA WITHOUT COMPLICATION: ICD-10-CM

## 2025-07-10 DIAGNOSIS — J45.50 STEROID-DEPENDENT ASTHMA, SEVERE PERSISTENT, UNCOMPLICATED: ICD-10-CM

## 2025-07-10 DIAGNOSIS — J82.83 EOSINOPHILIC ASTHMA: ICD-10-CM

## 2025-07-10 PROBLEM — R06.83 SNORING: Status: ACTIVE | Noted: 2025-07-10

## 2025-07-10 NOTE — PROCEDURES
PHYSICIAN INTERPRETATION AND COMMENTS: Clinically significant sleep disordered breathing is not identified. Clinically  significant sleep disordered breathing is not identified.    CLINICAL HISTORY: 29 year old female.BMI 20. Asthma. Altha Sleepiness Scale score 12-16.  SLEEP STUDY FINDINGS: Patient underwent a 1 night Home Sleep Test and by behavioral criteria, slept for approximately  5.42 hours, with a sleep latency of 6 minutes and a sleep efficiency of 90%. Snoring occurs for 4.8% (30 dB) of the study,  2.3% is very loud. The mean pulse rate is 68 BPM, with frequent pulse rate variability (54 events with >= 6 BPM  increase/decrease per hour).  TREATMENT CONSIDERATIONS: Consider in-lab polysomnography followed by MSLT.  DISEASE MANAGEMENT CONSIDERATIONS: The patient will be seen for a post-evaluation Sleep Medicine follow up to  discuss the above results and treatment recommendations.

## 2025-07-21 ENCOUNTER — CLINICAL SUPPORT (OUTPATIENT)
Dept: PULMONOLOGY | Facility: CLINIC | Age: 30
End: 2025-07-21
Payer: COMMERCIAL

## 2025-07-21 DIAGNOSIS — J45.50 SEVERE PERSISTENT ASTHMA WITHOUT COMPLICATION: ICD-10-CM

## 2025-07-21 DIAGNOSIS — Z78.9 FREQUENT HOSPITAL ADMISSIONS: ICD-10-CM

## 2025-07-21 PROCEDURE — 99999 PR PBB SHADOW E&M-EST. PATIENT-LVL II: CPT | Mod: PBBFAC,,,

## 2025-07-21 NOTE — PROGRESS NOTES
Pulmonary Disease Management  [facility]  Chronic Care Management  Initial Visit       Diagnosis: Severe persistent asthma  Last Hospital Admission: 6/18/25 ED  Last provider visit: 6/20/25      Current Outpatient Medications:     albuterol-budesonide (AIRSUPRA) 90-80 mcg/actuation, Inhale 2 puffs into the lungs every 4 (four) hours as needed (Shortness of Breath and/or wheezing)., Disp: 10.7 g, Rfl: 11    albuterol-ipratropium (DUO-NEB) 2.5 mg-0.5 mg/3 mL nebulizer solution, Take 3 mLs by nebulization every 4 (four) hours as needed for Wheezing or Shortness of Breath. Rescue, Disp: 120 each, Rfl: 3    azithromycin (Z-WANDA) 250 MG tablet, Take 1 tablet (250 mg total) by mouth once daily. Take first 2 tablets together, then 1 every day until finished., Disp: 6 tablet, Rfl: 0    dupilumab 300 mg/2 mL PnIj, Inject 2 mLs (300 mg total) into the skin every 14 (fourteen) days., Disp: 4 mL, Rfl: 11    fluticasone-umeclidin-vilanter (TRELEGY ELLIPTA) 200-62.5-25 mcg inhaler, Inhale 1 puff into the lungs once daily., Disp: 60 each, Rfl: 11    montelukast (SINGULAIR) 10 mg tablet, Take 1 tablet (10 mg total) by mouth every evening., Disp: 90 tablet, Rfl: 3    predniSONE (DELTASONE) 50 MG Tab, Take 1 tablet (50 mg total) by mouth once daily., Disp: 10 tablet, Rfl: 0    Review of patient's allergies indicates:  No Known Allergies    Smoking history:   Social History     Tobacco Use   Smoking Status Never   Smokeless Tobacco Never                                                                                                     PFT Results:  Pre FVC   Date/Time Value Ref Range Status   06/20/2025 03:37 PM 3.47 2.64 - 4.13 L Final     Pre FEV1   Date/Time Value Ref Range Status   06/20/2025 03:37 PM 1.84 (L) 2.23 - 3.46 L Final     Pre FEV1 FVC   Date/Time Value Ref Range Status   06/20/2025 03:37 PM 53.20 (L) 74.03 - 94.07 % Final               Patient Concerns or Expectations:   Itchy throat and nose, post nasal  drip. Message sent to allergy provider.  [facility] PA initiated.  Peak flow meter given to patient with instructions. Advised to record over the next 2 weeks to determine best peak flow.       Therapist Comments:   Georgina Interiano  was seen 7/21/2025  3:00 PM in the Pulmonary Disease management clinic for evaluation, education, reinforcement of self management techniques and exacerbation action plan.    Damaris Quintero    Past Medical History:   Diagnosis Date    Asthma     Unspecified chronic bronchitis            Educational assessment:   [x]            Good  []            Fair  []            Poor    Readiness to learn:   [x]            Good  []            Fair  []            Poor    Vision Status:   [x]            Good  []            Fair  []            Poor    Reading Ability:  [x]            Good  []            Fair  []            Poor    Knowledge of condition:   [x]            Good  []            Fair  []            Poor    Language Barriers:   []            Good  []            Fair  []            Poor  [x]            None    Cognitive/ Physical Barriers:   []            Good  []            Fair  []            Poor  [x]            None    Learning best by:                       []            Seeing  []            Hearing  []            Reading                         [x]            Doing    Describe any barrier /Limitation or financial implications of care choices identified     []            Financial  []            Emotional  []            Education  []            Vision/Hearing  []            Physical  [x]            None  []                TOPIC /CONTENT FOR TODAY:    [x]            MDI with or without spacer  [x]            Dry powder inhaler  []            Acapella   [x]           Peak Flow meter  [x]            Asthma action plan  [x]            Nebulizer use  []            Oxygen use safety  []            Breathing and cough techniques  []            Energy  conservation  [x]            Infection prevention  [x]            Asthma trigger  checklist        Learner:    [x]            Patient   []            Caregiver    Method:    []            Verbal explanation  []            Audio visual    [x]            Literature  [x]            Teach back      Evaluation:    [x]            Teach back  [x]            Demonstrate  [x]            Follow up phone call    []            2 weeks     [x]            4 weeks   []            PRN    Additional comments:   Patient was seen today to review respiratory medication purpose and proper technique for use of inhalers. Patient practiced proper technique using MDI with valved holding chamber (spacer) and DPI inhalers. Patient demonstrated understanding. Literature was given to patient.    She reports medication adherence.    Asthma trigger checklist was verbally reviewed and literature given to patient.     Infection prevention was discussed. Patient was reminded to get RSV vaccine. Hand hygiene and cleaning of respiratory equipment was also discussed. Patient verbalized understanding.      Asthma action plan was reviewed and literature was given to patient. Patient verbalized understanding.       Plan:  Monthly Pulmonary Disease Management Questionnaire  Follow-up PDM appointment scheduled for 1/21/26   Reinforce education  Meds: Dupixent, Trelegy, Airsupra, Duoneb  DME Needs: na  Action Plan: Asthma  Immunization: Pneumococcal- currejt, Flu-current  Next Provider Visit: 8/22/25   Next Spirometry/CPFT: NOT SCHEDULED  Approximate time spent with patient: 45 MINUTES

## 2025-07-21 NOTE — PATIENT INSTRUCTIONS
ACTION PLAN    GREEN ZONE  My sputum is clear/white/usual color and easily cleared.  My breathing is no harder than usual.  I can do my usual activities.  I can think clearly.   Take your usual medicines, including oxygen, as you are told to do so by your health care provider.   YELLOW ZONE  My sputum has change (color, thickness, amount).  I am more short of breath than usual.  I cough or wheeze more.  I weigh more and my legs/feet swell.  I cannot do my usual activities without resting.   Call your health care provider. You will probably be told to begin taking an antibiotic and prednisone. Have your pharmacy phone number available.   RED ZONE  I cannot cough out my sputum.  I am much more short of breath than normal.  I need to sit up to breathe  I cannot do my usual activities.  I am unable to speak more than one or two words at a time.  I am confused.   Call your health care provider. You may be asked to come in to be seen, told to go to the emergency room, or told to call 9-1-1.     Asthma Trigger Checklist  Allergens, irritants, and other things may trigger your asthma. Check the box next to each of your triggers. After each trigger is a list of ways to avoid it.   Dust mites. Dust mites live in mattresses, bedding, carpets, curtains, and indoor dust.  To kill dust mites, wash bedding in hot water (130°F) each week.  Cover mattress and pillows with special dust-mite-proof cases.  Don't use upholstered furniture like sofas or chairs in the bedroom.  Use allergy-proof filters for air conditioners and furnaces. Replace or clean them as instructed.  If you can, replace carpeting with wood or tile amada, especially in the bedroom.  Animals. Animals with fur or feathers shed dander (allergens).  It's best to choose a pet that doesn't have fur or feathers, such as a fish or a reptile.  If you have pets, keep them off your bed and out of your bedroom.  Wash your hands and clothes after handling pets.    Mold. Mold  grows in damp places, such as bathrooms, basements, and closets.  Ask someone to clean damp areas in your home every week. Or try wearing a face mask while you clean.  Run an exhaust fan while bathing. Or leave a window open in the bathroom.  Repair water leaks in or around your home.  Have someone else cut grass or rake leaves, if possible.  Don't use vaporizers or humidifiers. They encourage mold growth.    Pollen. Pollen from trees, grasses, and weeds is a common allergen. (Flower pollens are generally not a problem).  Try to learn what types of pollen affect you most. Pollen levels vary depending on the plant, the season, and the time of day.  If possible, use air conditioning instead of opening the windows in your home or car.  Have someone else do yard work, if possible.    Cockroaches. Roaches are found in many homes and produce allergens.  Keep your kitchen clean and dry. A leaky faucet or drain can attract roaches.  Remove garbage from your home daily.  Store food in tightly sealed containers. Wash dishes as soon as they are used.  Use bait stations or traps to control roaches. Avoid using chemical sprays.    Smoke. Smoke may be from cigarettes, cigars, pipes, incense or candles, barbecues or grills, and fireplaces.  Don't smoke. And don't let people smoke in your home or car.  When you travel, ask for nonsmoking rental cars and hotel rooms.  Avoid fireplaces and wood stoves. If you can't, sit away from them. Make sure the smoke is directed outside.  Don't burn incense or use candles.  Move away from smoky outdoor cooking grills.    Smog.  Smog is from car exhaust and other pollution.  Read or listen to local air-quality reports. These let you know when air quality is poor.  Stay indoors as much as you can on smoggy days. If possible, use air conditioning instead of opening the windows.  In your car, set air conditioning to recirculate air, so less pollution gets in.    Strong odors. These include air  fresheners, deodorizers, and cleaning products; perfume, deodorant, and other beauty products; incense and candles; and insect sprays and other sprays.  Use scent-free products like deodorant or body lotion.  Avoid using cleaning products with bleach and ammonia. Make your own cleaning solution with white vinegar, baking soda, or mild dish soap.  Use exhaust fans while cooking. Or open a window, if possible.   Avoid perfumes, air fresheners, potpourri, and other scented products.          Other irritants. These include dust, aerosol sprays, and powders.  Wear a face mask while doing tasks like sanding, dusting, sweeping, and yard work. Open doors and windows if working indoors.  Use pump spray bottles instead of aerosols.  Pour liquid  onto a rag or cloth instead of spraying them.    Weather. Weather conditions can trigger symptoms or make them worse.  Watch for very high or low temperatures, very humid conditions, or a lot of wind, as these conditions can make symptoms worse.  Limit outdoor activity during the type of weather that affects you.  Wear a scarf over your mouth and nose in cold weather.    Colds, flu, and sinus infections. Upper respiratory infections can trigger asthma.  Wash your hands often with soap and warm water or use a hand  containing alcohol.  Get a yearly flu shot. And ask if you should get a pneumonia vaccine.  Take care of your general health. Get plenty of sleep. And eat a variety of healthy foods.    Food additives. Food additives can trigger asthma flare-ups in some people.  Check food labels for sulfites or other similar ingredients. These are often found in foods such as wine, beer, and dried fruits.  Avoid foods that contain these additives.    Medicine. Aspirin, NSAIDS like ibuprofen and naproxen, and heart medicines like beta-blockers may be triggers.  Tell your health care provider if you think certain medicines trigger symptoms.   Be sure to read the labels on  over-the-counter medicines. They may have ingredients that cause symptoms for you.   , Emotions. Laughing, crying, or feeling excited are triggers for some people.   To help you stay calm: Try breathing in slowly through your nose for a count of 2 seconds. Close your lips and breathe out for 4 seconds. Repeat.  Try to focus on a soothing image in your mind. This will help relax you and calm your breathing.  Remember to take your daily controller medicines. When you're upset or under stress, it's easy to forget.    Exercise. For some people, exercise can trigger symptoms. Don't let this keep you from being active.   If you have not been exercising regularly, start slow and work up gradually.  Take all of your medicines as prescribed.  If you use quick-relief medicine, make sure you have it with you when you exercise.  Stop if you have any symptoms. Make sure you talk with your provider about these symptoms.  © 6452-9729 [employer]. [address] [place] [address], [place] [address]. All rights reserved. This information is not intended as a substitute for professional medical care. Always follow your healthcare professional's instructions.

## 2025-07-23 ENCOUNTER — OFFICE VISIT (OUTPATIENT)
Dept: ALLERGY | Facility: CLINIC | Age: 30
End: 2025-07-23
Payer: COMMERCIAL

## 2025-07-23 VITALS
OXYGEN SATURATION: 97 % | HEART RATE: 73 BPM | SYSTOLIC BLOOD PRESSURE: 103 MMHG | DIASTOLIC BLOOD PRESSURE: 67 MMHG | BODY MASS INDEX: 22.09 KG/M2 | WEIGHT: 132.69 LBS

## 2025-07-23 DIAGNOSIS — J45.50 STEROID-DEPENDENT ASTHMA, SEVERE PERSISTENT, UNCOMPLICATED: ICD-10-CM

## 2025-07-23 DIAGNOSIS — J01.00 ACUTE MAXILLARY SINUSITIS, RECURRENCE NOT SPECIFIED: ICD-10-CM

## 2025-07-23 DIAGNOSIS — J45.50 SEVERE PERSISTENT ASTHMA WITHOUT COMPLICATION: Primary | ICD-10-CM

## 2025-07-23 DIAGNOSIS — J82.83 EOSINOPHILIC ASTHMA: ICD-10-CM

## 2025-07-23 PROCEDURE — 1160F RVW MEDS BY RX/DR IN RCRD: CPT | Mod: CPTII,S$GLB,, | Performed by: STUDENT IN AN ORGANIZED HEALTH CARE EDUCATION/TRAINING PROGRAM

## 2025-07-23 PROCEDURE — 94010 BREATHING CAPACITY TEST: CPT | Mod: S$GLB,,, | Performed by: STUDENT IN AN ORGANIZED HEALTH CARE EDUCATION/TRAINING PROGRAM

## 2025-07-23 PROCEDURE — 3008F BODY MASS INDEX DOCD: CPT | Mod: CPTII,S$GLB,, | Performed by: STUDENT IN AN ORGANIZED HEALTH CARE EDUCATION/TRAINING PROGRAM

## 2025-07-23 PROCEDURE — 99215 OFFICE O/P EST HI 40 MIN: CPT | Mod: 25,S$GLB,, | Performed by: STUDENT IN AN ORGANIZED HEALTH CARE EDUCATION/TRAINING PROGRAM

## 2025-07-23 PROCEDURE — 3074F SYST BP LT 130 MM HG: CPT | Mod: CPTII,S$GLB,, | Performed by: STUDENT IN AN ORGANIZED HEALTH CARE EDUCATION/TRAINING PROGRAM

## 2025-07-23 PROCEDURE — 99999 PR PBB SHADOW E&M-EST. PATIENT-LVL III: CPT | Mod: PBBFAC,,, | Performed by: STUDENT IN AN ORGANIZED HEALTH CARE EDUCATION/TRAINING PROGRAM

## 2025-07-23 PROCEDURE — 3078F DIAST BP <80 MM HG: CPT | Mod: CPTII,S$GLB,, | Performed by: STUDENT IN AN ORGANIZED HEALTH CARE EDUCATION/TRAINING PROGRAM

## 2025-07-23 PROCEDURE — 1159F MED LIST DOCD IN RCRD: CPT | Mod: CPTII,S$GLB,, | Performed by: STUDENT IN AN ORGANIZED HEALTH CARE EDUCATION/TRAINING PROGRAM

## 2025-07-23 RX ORDER — AZELASTINE 1 MG/ML
1 SPRAY, METERED NASAL 2 TIMES DAILY
Qty: 30 ML | Refills: 11 | Status: SHIPPED | OUTPATIENT
Start: 2025-07-23 | End: 2026-07-23

## 2025-07-23 RX ORDER — AMOXICILLIN AND CLAVULANATE POTASSIUM 875; 125 MG/1; MG/1
1 TABLET, FILM COATED ORAL EVERY 12 HOURS
Qty: 14 TABLET | Refills: 0 | Status: SHIPPED | OUTPATIENT
Start: 2025-07-23 | End: 2025-07-30

## 2025-07-23 RX ORDER — PREDNISONE 20 MG/1
40 TABLET ORAL DAILY
Qty: 14 TABLET | Refills: 0 | Status: SHIPPED | OUTPATIENT
Start: 2025-07-23 | End: 2025-07-30

## 2025-07-23 RX ORDER — FLUTICASONE PROPIONATE 50 MCG
1 SPRAY, SUSPENSION (ML) NASAL 2 TIMES DAILY
Qty: 16 G | Refills: 11 | Status: SHIPPED | OUTPATIENT
Start: 2025-07-23 | End: 2026-07-23

## 2025-07-23 RX ORDER — TEZEPELUMAB-EKKO 210 MG/1.9ML
210 INJECTION, SOLUTION SUBCUTANEOUS
Qty: 1.91 ML | Refills: 11 | Status: ACTIVE | OUTPATIENT
Start: 2025-07-23 | End: 2026-07-23

## 2025-07-23 NOTE — PROGRESS NOTES
Allergy and Immunology  Established Patient Clinic Note    Date: 7/23/2025  Chief Complaint   Patient presents with    Follow-up     Asthma     History  Georgina Interiano is a 29 y.o. female being seen for follow-up today.    Acute sinusitis  Chronic Nonallergic Rhinitis   Typically no acute complaints but sinus infection at this time     Severe Persistent Eosinophilic Asthma  Steroid-Dependent Asthma   Some improvement since Dupixent but not fully controlled  Continue Trelegy daily  Switching from Dupixent to Tezspire    Allergies, PMH, PSH, Social, and Family History were reviewed.    Medications Ordered Prior to Encounter[1]    Physical Examination  Vitals:    07/23/25 0755   BP: 103/67   Pulse: 73     GENERAL:  female in no apparent distress and well developed and well nourished  HEAD:  Normocephalic, without obvious abnormality, atraumatic  EYES: sclera anicteric, conjunctiva normochromic  EARS: normal TM's and external ear canals both ears  NOSE: swollen and discharge present, mucoid discharge, turbinates swollen    OROPHARYNX: moist mucous membranes without erythema, exudates or petechiae, clear post-nasal drainage present  LYMPH NODES: normal, supple, no lymphadenopathy  LUNGS: clear to auscultation, no wheezes, rales or rhonchi, symmetric air entry.  HEART: normal rate, regular rhythm, normal S1, S2, no murmurs, rubs, clicks or gallops.  ABDOMEN: soft, nontender, nondistended, no masses or organomegaly.  MUSCULOSKELETAL: no gross joint deformity or swelling.  NEURO: alert, oriented, normal speech, no focal findings or movement disorder noted.  SKIN: normal coloration and turgor, no rashes, no suspicious skin lesions noted.     Assessment/Plan:   Problem List Items Addressed This Visit       Severe persistent asthma without complication - Primary    Overview   Followed by Pulmonology, continue current treatment plan   - High risk patient  - Patient with multiple ED visits per year   - Patient with  multiple rounds of oral and IV/IM steroids per year      - 12/05/2024: Serum IgE to Zone 6 Aeroallergens negative; Serum IgE elevated at 458  - 11/26/2024: ED visit for Asthma Exacerbation- Acute Respiratory Failure   - 05/17/2024: ED visit for Asthma Exacerbation  - 01/20/2024: ED visit for Asthma Exacerbation         Relevant Medications    tezepelumab-ekko (TEZSPIRE) 210 mg/1.91 mL (110 mg/mL) PnIj    Other Relevant Orders    Spirometry without Bronchodilator (Completed)    Eosinophilic asthma    Relevant Medications    tezepelumab-ekko (TEZSPIRE) 210 mg/1.91 mL (110 mg/mL) PnIj    Other Relevant Orders    Spirometry without Bronchodilator (Completed)    Steroid-dependent asthma, severe persistent, uncomplicated    Relevant Medications    tezepelumab-ekko (TEZSPIRE) 210 mg/1.91 mL (110 mg/mL) PnIj    Other Relevant Orders    Spirometry without Bronchodilator (Completed)     Other Visit Diagnoses         Acute maxillary sinusitis, recurrence not specified        Relevant Medications    amoxicillin-clavulanate 875-125mg (AUGMENTIN) 875-125 mg per tablet    predniSONE (DELTASONE) 20 MG tablet          Acute sinusitis  Chronic Nonallergic Rhinitis   Typically no acute complaints but sinus infection at this time  Ordered Augmentin and prednisone  ED precautions discussed     Severe Persistent Eosinophilic Asthma  Steroid-Dependent Asthma   Some improvement since Dupixent but not fully controlled  Continue Trelegy daily  Switching from Dupixent to Tezspire  ED precautions and inhaler education    Follow up:  Follow up in about 3 months (around 10/23/2025).    40+ minutes was spent on direct and indirect health care of this patient.  Additional timing was spent to discuss biologic therapy as well as acute sinus infection.    DISCLAIMER: This note was prepared with [employer] voice recognition transcription software. Garbled syntax, mangled pronouns, and other bizarre constructions may be attributed to that software system.  While efforts were made to correct any mistakes made by this voice recognition program, some errors and/or omissions may remain in the note that were missed when the note was originally created.     Bennie Dumas MD   [facility]  [facility]       [1]   Current Outpatient Medications on File Prior to Visit   Medication Sig Dispense Refill    albuterol-budesonide (AIRSUPRA) 90-80 mcg/actuation Inhale 2 puffs into the lungs every 4 (four) hours as needed (Shortness of Breath and/or wheezing). 10.7 g 11    albuterol-ipratropium (DUO-NEB) 2.5 mg-0.5 mg/3 mL nebulizer solution Take 3 mLs by nebulization every 4 (four) hours as needed for Wheezing or Shortness of Breath. Rescue 120 each 3    azithromycin (Z-WANDA) 250 MG tablet Take 1 tablet (250 mg total) by mouth once daily. Take first 2 tablets together, then 1 every day until finished. 6 tablet 0    dupilumab 300 mg/2 mL PnIj Inject 2 mLs (300 mg total) into the skin every 14 (fourteen) days. 4 mL 11    fluticasone-umeclidin-vilanter (TRELEGY ELLIPTA) 200-62.5-25 mcg inhaler Inhale 1 puff into the lungs once daily. 60 each 11    montelukast (SINGULAIR) 10 mg tablet Take 1 tablet (10 mg total) by mouth every evening. 90 tablet 3    predniSONE (DELTASONE) 50 MG Tab Take 1 tablet (50 mg total) by mouth once daily. 10 tablet 0     No current facility-administered medications on file prior to visit.

## 2025-07-23 NOTE — LETTER
July 23, 2025      [facility]  [address] [place] [address] [address] [address]  Phone: [phone]  Fax: [phone]       Patient: Georgina Interiano   YOB: 1995  Date of Visit: 07/23/2025    To Whom It May Concern:    Juan David Interiano  was at [facility] on 07/23/2025. The patient may return to work on 07/23/02025 with no restrictions. If you have any questions or concerns, or if I can be of further assistance, please do not hesitate to contact me.    Sincerely,    Sabiha Hsu LPN

## 2025-07-23 NOTE — PROGRESS NOTES
Allergy and Immunology  Procedure Note     Spirometry  Date FEV1 (L)  (% predicted) FVC (L)  (% predicted) FEV1/ FVC [id number]%  (% predicted)   07/23/2025 66% 99% 57% 32%                 Interpretation: Obstructive ratio. FEV1 consistent with moderate obstructive.   Significant distal obstruction.     Bennie Dumas MD   [facility]  Allergy and Clinical Immunology

## 2025-08-22 ENCOUNTER — OFFICE VISIT (OUTPATIENT)
Dept: PULMONOLOGY | Facility: CLINIC | Age: 30
End: 2025-08-22
Payer: COMMERCIAL

## 2025-08-22 VITALS
HEIGHT: 65 IN | DIASTOLIC BLOOD PRESSURE: 60 MMHG | OXYGEN SATURATION: 97 % | BODY MASS INDEX: 21.95 KG/M2 | WEIGHT: 131.75 LBS | SYSTOLIC BLOOD PRESSURE: 105 MMHG | HEART RATE: 66 BPM | RESPIRATION RATE: 15 BRPM

## 2025-08-22 DIAGNOSIS — J31.0 CHRONIC RHINITIS: ICD-10-CM

## 2025-08-22 DIAGNOSIS — J45.50 SEVERE PERSISTENT ASTHMA WITHOUT COMPLICATION: Primary | ICD-10-CM

## 2025-08-22 DIAGNOSIS — Z78.9 FREQUENT HOSPITAL ADMISSIONS: ICD-10-CM

## 2025-08-22 DIAGNOSIS — J82.83 EOSINOPHILIC ASTHMA: ICD-10-CM

## 2025-08-22 PROCEDURE — 99999 PR PBB SHADOW E&M-EST. PATIENT-LVL IV: CPT | Mod: PBBFAC,,, | Performed by: INTERNAL MEDICINE
